# Patient Record
Sex: MALE | Race: WHITE | NOT HISPANIC OR LATINO | Employment: FULL TIME | URBAN - METROPOLITAN AREA
[De-identification: names, ages, dates, MRNs, and addresses within clinical notes are randomized per-mention and may not be internally consistent; named-entity substitution may affect disease eponyms.]

---

## 2017-12-20 ENCOUNTER — OFFICE VISIT - RIVER FALLS (OUTPATIENT)
Dept: FAMILY MEDICINE | Facility: CLINIC | Age: 42
End: 2017-12-20

## 2017-12-20 ASSESSMENT — MIFFLIN-ST. JEOR: SCORE: 2324.83

## 2017-12-21 LAB
CHOLEST SERPL-MCNC: 189 MG/DL
CHOLEST/HDLC SERPL: 4.6 {RATIO}
CREAT SERPL-MCNC: 0.98 MG/DL (ref 0.6–1.35)
GLUCOSE BLD-MCNC: 90 MG/DL (ref 65–99)
HDLC SERPL-MCNC: 41 MG/DL
LDLC SERPL CALC-MCNC: 126 MG/DL
NONHDLC SERPL-MCNC: 148 MG/DL
TRIGL SERPL-MCNC: 113 MG/DL

## 2018-10-23 ENCOUNTER — AMBULATORY - RIVER FALLS (OUTPATIENT)
Dept: FAMILY MEDICINE | Facility: CLINIC | Age: 43
End: 2018-10-23

## 2019-02-13 ENCOUNTER — OFFICE VISIT - RIVER FALLS (OUTPATIENT)
Dept: FAMILY MEDICINE | Facility: CLINIC | Age: 44
End: 2019-02-13

## 2019-02-13 ASSESSMENT — MIFFLIN-ST. JEOR: SCORE: 2327.55

## 2019-07-20 ENCOUNTER — OFFICE VISIT - RIVER FALLS (OUTPATIENT)
Dept: FAMILY MEDICINE | Facility: CLINIC | Age: 44
End: 2019-07-20

## 2020-05-12 ENCOUNTER — COMMUNICATION - RIVER FALLS (OUTPATIENT)
Dept: FAMILY MEDICINE | Facility: CLINIC | Age: 45
End: 2020-05-12

## 2020-05-13 ENCOUNTER — AMBULATORY - RIVER FALLS (OUTPATIENT)
Dept: FAMILY MEDICINE | Facility: CLINIC | Age: 45
End: 2020-05-13

## 2021-03-24 ENCOUNTER — AMBULATORY - RIVER FALLS (OUTPATIENT)
Dept: FAMILY MEDICINE | Facility: CLINIC | Age: 46
End: 2021-03-24

## 2021-04-21 ENCOUNTER — AMBULATORY - RIVER FALLS (OUTPATIENT)
Dept: FAMILY MEDICINE | Facility: CLINIC | Age: 46
End: 2021-04-21

## 2021-10-27 ENCOUNTER — OFFICE VISIT - RIVER FALLS (OUTPATIENT)
Dept: FAMILY MEDICINE | Facility: CLINIC | Age: 46
End: 2021-10-27

## 2021-10-27 ASSESSMENT — MIFFLIN-ST. JEOR: SCORE: 2304.87

## 2021-10-28 ENCOUNTER — AMBULATORY - RIVER FALLS (OUTPATIENT)
Dept: FAMILY MEDICINE | Facility: CLINIC | Age: 46
End: 2021-10-28

## 2021-10-28 LAB
BUN SERPL-MCNC: 16 MG/DL (ref 7–25)
BUN/CREAT RATIO - HISTORICAL: NORMAL (ref 6–22)
CALCIUM SERPL-MCNC: 10 MG/DL (ref 8.6–10.3)
CHLORIDE BLD-SCNC: 104 MMOL/L (ref 98–110)
CHOLEST SERPL-MCNC: 192 MG/DL
CHOLEST/HDLC SERPL: 4.4 {RATIO}
CO2 SERPL-SCNC: 25 MMOL/L (ref 20–32)
COLOR UR AUTO: NORMAL
CREAT SERPL-MCNC: 0.92 MG/DL (ref 0.6–1.35)
EGFRCR SERPLBLD CKD-EPI 2021: 99 ML/MIN/1.73M2
GLUCOSE BLD-MCNC: 76 MG/DL (ref 65–99)
HDLC SERPL-MCNC: 44 MG/DL
LDLC SERPL CALC-MCNC: 128 MG/DL
NONHDLC SERPL-MCNC: 148 MG/DL
POTASSIUM BLD-SCNC: 4 MMOL/L (ref 3.5–5.3)
SODIUM SERPL-SCNC: 140 MMOL/L (ref 135–146)
TRIGL SERPL-MCNC: 98 MG/DL
TSH SERPL DL<=0.005 MIU/L-ACNC: 2.76 MIU/L (ref 0.4–4.5)
URATE SERPL-MCNC: 9.1 MG/DL (ref 4–8)

## 2021-10-29 LAB — H PYLORI AG STL QL IA: NORMAL

## 2021-10-31 ENCOUNTER — COMMUNICATION - RIVER FALLS (OUTPATIENT)
Dept: FAMILY MEDICINE | Facility: CLINIC | Age: 46
End: 2021-10-31

## 2021-11-30 ENCOUNTER — AMBULATORY - RIVER FALLS (OUTPATIENT)
Dept: FAMILY MEDICINE | Facility: CLINIC | Age: 46
End: 2021-11-30

## 2021-11-30 ENCOUNTER — OFFICE VISIT - RIVER FALLS (OUTPATIENT)
Dept: FAMILY MEDICINE | Facility: CLINIC | Age: 46
End: 2021-11-30

## 2021-12-01 ENCOUNTER — COMMUNICATION - RIVER FALLS (OUTPATIENT)
Dept: FAMILY MEDICINE | Facility: CLINIC | Age: 46
End: 2021-12-01

## 2021-12-01 LAB
BUN SERPL-MCNC: 21 MG/DL (ref 7–25)
BUN/CREAT RATIO - HISTORICAL: NORMAL (ref 6–22)
CALCIUM SERPL-MCNC: 9.7 MG/DL (ref 8.6–10.3)
CHLORIDE BLD-SCNC: 105 MMOL/L (ref 98–110)
CO2 SERPL-SCNC: 25 MMOL/L (ref 20–32)
CREAT SERPL-MCNC: 1.08 MG/DL (ref 0.6–1.35)
EGFRCR SERPLBLD CKD-EPI 2021: 82 ML/MIN/1.73M2
GLUCOSE BLD-MCNC: 86 MG/DL (ref 65–99)
POTASSIUM BLD-SCNC: 4.1 MMOL/L (ref 3.5–5.3)
SODIUM SERPL-SCNC: 140 MMOL/L (ref 135–146)

## 2021-12-27 ENCOUNTER — COMMUNICATION - RIVER FALLS (OUTPATIENT)
Dept: FAMILY MEDICINE | Facility: CLINIC | Age: 46
End: 2021-12-27

## 2022-02-11 VITALS
HEART RATE: 76 BPM | HEIGHT: 77 IN | DIASTOLIC BLOOD PRESSURE: 98 MMHG | WEIGHT: 295 LBS | BODY MASS INDEX: 34.83 KG/M2 | TEMPERATURE: 97.7 F | SYSTOLIC BLOOD PRESSURE: 138 MMHG

## 2022-02-11 VITALS
BODY MASS INDEX: 34.76 KG/M2 | WEIGHT: 294.4 LBS | DIASTOLIC BLOOD PRESSURE: 85 MMHG | HEART RATE: 72 BPM | TEMPERATURE: 98 F | HEIGHT: 77 IN | SYSTOLIC BLOOD PRESSURE: 133 MMHG

## 2022-02-11 VITALS
HEART RATE: 50 BPM | DIASTOLIC BLOOD PRESSURE: 90 MMHG | OXYGEN SATURATION: 98 % | SYSTOLIC BLOOD PRESSURE: 130 MMHG | TEMPERATURE: 98 F | WEIGHT: 286.2 LBS

## 2022-02-11 VITALS
BODY MASS INDEX: 34.24 KG/M2 | HEIGHT: 77 IN | HEART RATE: 73 BPM | TEMPERATURE: 97.6 F | SYSTOLIC BLOOD PRESSURE: 141 MMHG | WEIGHT: 290 LBS | DIASTOLIC BLOOD PRESSURE: 93 MMHG

## 2022-02-16 NOTE — TELEPHONE ENCOUNTER
---------------------  From: Jihan Salcedo CMA   To: [employer] Message Pool ([id number]_[place] [facility] [place]);     Sent: 2/4/2019 1:22:42 PM CST  Subject: Rx FYI     PCP:   ANDRIY      Time of Call:  1242       Person Calling:  Patient  Phone number:  [phone]    Returned call at: 1320    Note:   Patient called requesting an Rx for Indomethacin for gout flare up. This was last filled 1/23/17 and patient is overdue for annual appt. He was informed he would need an appointment to address concerns. Pt states he will call back to make an appt.    Last office visit and reason:  12/20/2017 Annual w/ TFS---------------------  From: April Quesada ([employer] Message Pool ([id number]))   To: Kvng Ordonez MD;     Sent: 2/6/2019 10:37:16 AM CST  Subject: FW: Rx FYI     okay to fill?---------------------  From: Kvng Ordonez MD   To: [employer] Message Pool ([id number]_[place] [facility] [place]);     Sent: 2/6/2019 11:01:24 AM CST  Subject: RE: Rx FYI     ok to refill same # and directionsrx sent.

## 2022-02-16 NOTE — PROGRESS NOTES
Patient:   LUCIE SULLIVAN            MRN: [id number]            FIN: [id number]               Age:   43 years     Sex:  Male     :  1975   Associated Diagnoses:   Annual physical exam; GERD (gastroesophageal reflux disease); Umbilical hernia   Author:   Taye Francois MD      Chief Complaint   2019 4:49 PM CST    Annual exam.        Well Adult History   Had torn left meniscus in past with arthroscopy.  Takes indomethacin about a couple times a year.  Gets joint pain in elbows and shoulders with working out. Feels restricted in motion.  Muscles cramp up in sleep noticed by girlfriend. Gets leg cramps during the day sometimes.  Gets some dry skin in the winter.  Has been checking blood pressure at home. Has had diastolic 101 when trying to donate blood and turned down.  Lack of energy on the weekends 6 months.  Heartburn for the past 6 months with Tums twice a day. Took Prilosec 15 years ago  Snores alot and feels rested in the morning during the week.  No sleep apnea (girlfriend has not reported)  Trouble with erections since vasectomy. Able to get erections and ejaculate just takes longer. Tried Cialis thru the mail and helped      Review of Systems   Constitutional:  No fever, No weakness, No fatigue.    Eye:  No recent visual problem.    Ear/Nose/Mouth/Throat:  No ear pain, No tinnitus.    Respiratory:  No shortness of breath, No cough, No wheezing.    Cardiovascular:  No chest pain, No peripheral edema.    Gastrointestinal:  No nausea, No vomiting, No diarrhea.    Genitourinary:  No dysuria, No change in urine stream.    Hematology/Lymphatics:  No bruising tendency, No bleeding tendency.    Endocrine:  Negative.    Immunologic:  Negative.    Musculoskeletal:  No joint pain, No decreased range of motion.    Integumentary:  No rash.    Neurologic:  No numbness, No headache.    Psychiatric:  Negative.    All other systems reviewed and negative   PHQ-9: 3pts (2019)  CAGE: 0pts with 5-10 drinks a  week      Health Status   Allergies:    Allergic Reactions (Selected)  No known allergies   Medications:  (Selected)   Prescriptions  Prescribed  indomethacin 50 mg oral capsule: = 1 cap(s) ( 50 mg ), PO, TID, PRN: for gout pain, # 30 cap(s), 5 Refill(s), Type: Maintenance, Pharmacy: [facility], 1 cap(s) Oral tid,PRN:for gout pain   Problem list:    All Problems  HLD (hyperlipidemia) / SNOMED CT 61418889 / Confirmed  Obese / SNOMED CT 3517247515 / Probable  Resolved: History of chicken pox / SNOMED CT Z294VJQ2-0685--GE858T6100H2      Histories   Procedure history:    Medial meniscus tear (IMO 304860) performed by Sunny Monique MD on 8/17/2016 at 41 Years.  Comments:  10/13/2016 2:15 PM CDT - [place] Goldberg  Left knee.  History of tonsillectomy (SNOMED CT 4572553876) in 1980 at 5 Years.  [place] teeth extracted (ICD-9-.50).  Hx of vasectomy (SNOMED CT ZQS30B38-F5DK-3L13-[id number]).     Social History: Relationship (Christie) 2016. 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 Nonsmoker  Family History: Parents healthy      Physical Examination   Vital Signs   2/13/2019 5:14 PM CST Systolic Blood Pressure 138 mmHg  HI    Diastolic Blood Pressure 98 mmHg  HI   2/13/2019 4:49 PM CST Temperature Tympanic 97.7 DegF  LOW    Peripheral Pulse Rate 76 bpm    Systolic Blood Pressure 142 mmHg  HI    Diastolic Blood Pressure 112 mmHg  HI      Measurements from flowsheet : Measurements   2/13/2019 4:49 PM CST Height Measured - Standard 76.5 in    Weight Measured - Standard 295 lb    BSA 2.68 m2    Body Mass Index 35.44 kg/m2  HI      General:  Alert and oriented, No acute distress.    Eye:  Normal conjunctiva.    HENT:  Tympanic membranes are clear, No pharyngeal erythema.    Neck:  No lymphadenopathy, No thyromegaly.    Respiratory:  Lungs are clear to auscultation.    Cardiovascular:  Normal rate, Regular rhythm, No murmur, No edema.    Gastrointestinal:  Soft,  Non-tender, Non-distended, umbilical hernia.    Genitourinary:  Normal genitalia for age and sex.    Musculoskeletal:  Normal range of motion, Normal strength, No tenderness, Normal gait.    Integumentary:  Warm, No rash.    Neurologic:  Alert, Oriented, Normal sensory, Normal motor function, No focal deficits, Normal deep tendon reflexes.    Psychiatric:  Cooperative, Appropriate mood & affect.       Impression and Plan   Diagnosis     Annual physical exam (UCJ96-UY Z00.00).     GERD (gastroesophageal reflux disease) (HAK78-OI K21.9).     Umbilical hernia (OKN48-PB K42.9).         Cardiac: check fasting labs. Discussed importance of weight loss  Colon and Prostate Cancer Screen: no early family history  Immunizations: Adacel 2014  GERD: handout given and encourage Prilosec OTC for a couple months.  Weight: discussed  Erectile dysfunction: will check labs and try viagra  Umbilical hernia: asymptomatic and monitor  Eczema: discussed Cortaid and lotion  Blood pressure: continue to monitor and follow up in 1-2 months for recheck

## 2022-02-16 NOTE — NURSING NOTE
CAGE Assessment Entered On:  2/14/2019 12:34 PM CST    Performed On:  2/13/2019 12:34 PM CST by Fina Rice               Assessment   Have you ever felt you should cut down on your drinking :   No   Have people annoyed you by criticizing your drinking :   No   Have you ever felt bad or guilty about your drinking :   No   Have you ever taken a drink first thing in the morning to steady your nerves or get rid of a hangover (Eye-opener) :   No   CAGE Score :   0    Fina Rice - 2/14/2019 12:34 PM CST

## 2022-02-16 NOTE — LETTER
(Inserted Image. Unable to display)            October 31, 2021        LUCIE SULLIVAN      [address] [place] [address]        Dear LUCIE,    Thank you for selecting [facility]  for your healthcare needs.  Below you will find the results of the recent tests done at our clinic.     Your uric acid is elevated. We will discuss your gout treatment on your next visit.  Please let us know you received this message by either selecting Forward or Reply at the top.  Thank you      Result Name Current Result Reference Range   Uric Acid (mg/dL) ((H)) 9.1 10/27/2021 4.0 - 8.0   Cholesterol (mg/dL)  192 10/27/2021  - <200   HDL (mg/dL)  44 10/27/2021 > OR = 40 -    Triglyceride (mg/dL)  98 10/27/2021  - <150   LDL ((H)) 128 10/27/2021    Cholesterol/HDL Ratio  4.4 10/27/2021  - <5.0   Non-HDL Cholesterol ((H)) 148 10/27/2021  - <130   Glucose Level (mg/dL)  76 10/27/2021 65 - 99   BUN (mg/dL)  16 10/27/2021 7 - 25   Creatinine Level (mg/dL)  0.92 10/27/2021 0.60 - 1.35   eGFR (mL/min/1.73m2)  99 10/27/2021 > OR = 60 -    eGFR  (mL/min/1.73m2)  115 10/27/2021 > OR = 60 -    BUN/Creat Ratio  NOT APPLICABLE 10/27/2021 6 - 22   Sodium Level (mmol/L)  140 10/27/2021 135 - 146   Potassium Level (mmol/L)  4.0 10/27/2021 3.5 - 5.3   Chloride Level (mmol/L)  104 10/27/2021 98 - 110   CO2 Level (mmol/L)  25 10/27/2021 20 - 32   Calcium Level (mg/dL)  10.0 10/27/2021 8.6 - 10.3   TSH (mIU/L)  2.76 10/27/2021 0.40 - 4.50   UA Color  See comment 10/27/2021    Helicobacter pylori Ag Stool  See comment 10/28/2021        Please contact me or my assistant at [phone] if you have any questions.     Sincerely,        Kvng Ordonez MD        What do your labs mean?  Below is a glossary of commonly ordered labs:  LDL   Bad Cholesterol   HDL   Good Cholesterol  AST/ALT   Liver Function   Cr/Creatinine   Kidney Function  Microalbumin   Kidney Function  BUN   Kidney Function  PSA   Prostate     TSH   Thyroid Hormone  HgbA1c   Diabetes Test   Hgb (Hemoglobin)   Red Blood Cells

## 2022-02-16 NOTE — PROGRESS NOTES
Patient:   MICHAEL SULLIVAN            MRN: [id number]            FIN: [id number]               Age:   46 years     Sex:  Male     :  1975   Associated Diagnoses:   Well adult exam; Chronic GERD; Chronic gout; HLD (hyperlipidemia); HTN (hypertension)   Author:   Kvng Ordonez MD      Visit Information      Date of Service: 10/27/2021 04:13 pm  Performing Location: [facility]  Encounter#: [id number]      Primary Care Provider (PCP):  Kvng Ordonez MD    NPI# [id number]      Referring Provider:  Kvng Ordonez MD# [id number]      Chief Complaint   10/27/2021 4:16 PM CDT   Annual exam.     Routine Health Care Visit      History of Present Illness   Michael is in today for physical exam.  Overall he is doing well.  He is .  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  No tobacco use.  He does have a few beers several times a week.  He notes he has issues with he thinks gout as he gets twinges of pain in his ankles a few times a week for which he takes indomethacin it seems to help.  He does get leg cramps at night a couple times a week.         Review of Systems   Constitutional:  Negative.    Eye:  Negative.    Ear/Nose/Mouth/Throat:  Negative.    Respiratory:  Negative.    Cardiovascular:  Negative.    Gastrointestinal:  Negative.    Genitourinary:  Negative.    Hematology/Lymphatics:  Negative.    Endocrine:  Negative.    Immunologic:  Negative.    Musculoskeletal:  Negative except as documented in history of present illness.    Integumentary:  Negative.    Neurologic:  Negative.    Psychiatric:  Negative.    All other systems reviewed and negative      Health Status   Allergies:    Allergic Reactions (Selected)  No known allergies   Medications:  (Selected)   Prescriptions  Prescribed  Viagra 100 mg oral tablet: 0.5-1 tab(s), PO, Daily, # 10 tab(s), 1 Refill(s), Type: Maintenance, Pharmacy: [facility], 0.5-1 tab(s) Oral daily  indomethacin 50 mg oral capsule: = 1 cap(s)  ( 50 mg ), Oral, tid, PRN: for gout pain, # 30 cap(s), 2 Refill(s), Type: Maintenance, Pharmacy: [facility], 1 cap(s) Oral tid,PRN:for gout pain, 76.5, in, 10/27/21 16:20:00 CDT, Height Measured, 290, lb, 10/27/21 16:20:00 CDT, Weight Lisa...  losartan 50 mg oral tablet: = 1 tab(s) ( 50 mg ), Oral, daily, # 90 tab(s), 1 Refill(s), Type: Maintenance, Pharmacy: [facility], 1 tab(s) Oral daily, 76.5, in, 10/27/21 16:20:00 CDT, Height Measured, 290, lb, 10/27/21 16:20:00 CDT, Weight Measured,    Medications          *denotes recorded medication          indomethacin 50 mg oral capsule: 50 mg, 1 cap(s), Oral, tid, PRN: for gout pain, 30 cap(s), 2 Refill(s).          losartan 50 mg oral tablet: 50 mg, 1 tab(s), Oral, daily, 90 tab(s), 1 Refill(s).          Viagra 100 mg oral tablet: 0.5-1 tab(s), PO, Daily, 10 tab(s), 1 Refill(s).       Problem list:    All Problems  Obese / SNOMED CT 9546612222 / Probable  HLD (hyperlipidemia) / SNOMED CT 19340015 / Confirmed  Resolved: History of chicken pox / SNOMED CT K065LHE2-1925--PH851G2530B5      Histories   Past Medical History:    Active  Obese (1637148551)  HLD (hyperlipidemia) (29583728)  Comments:  1/22/2013 CST 9:14 AM CST - Candice Infante  Treated with diet.  Resolved  History of chicken pox (V064CJT3-7786--BT629A6364Z0):  Resolved.   Family History:    Grandmother (P)  Diabetes mellitus  Mother: Sera      History is negative.  Father: Olaf    Diabetes mellitus  Brother: Sridhar      History is negative.     Procedure history:    Medial meniscus tear (IMO 785111) performed by Sunny Monique MD on 8/17/2016 at 41 Years.  Comments:  10/13/2016 2:15 PM CDT - Yusuf , [place]  Left knee.  History of tonsillectomy (SNOMED CT 1391414938) in 1980 at 5 Years.  [place] teeth extracted (ICD-9-.50).  Hx of vasectomy (SNOMED CT FJT83C84-M3BL-8H43-[id number]).   Social History:        Electronic Cigarette/Vaping Assessment            Electronic Cigarette  Use: Never.      Alcohol Assessment            Current, 3 times per week, 1 drinks/episode average.  2 drinks/episode maximum.      Tobacco Assessment            Never            Never (less than 100 in lifetime)      Substance Abuse Assessment            Never      Employment and Education Assessment            Employed, Work/School description: ..      Home and Environment Assessment            Marital status: .      Nutrition and Health Assessment            Type of diet: Regular.      Exercise and Physical Activity Assessment            Exercise frequency: Never.      Sexual Assessment            Sexually active: Yes.  Identifies as male, Sexual orientation: Straight or heterosexual.  History of STD:               Yes.  Contraceptive Use Details: vasectomy.        Physical Examination   Vital Signs   10/27/2021 4:16 PM CDT Temperature Tympanic 97.6 DegF  LOW    Peripheral Pulse Rate 73 bpm    Pulse Site Radial artery    HR Method Electronic    Systolic Blood Pressure 141 mmHg  HI    Diastolic Blood Pressure 93 mmHg  HI    Mean Arterial Pressure 109 mmHg    BP Site Right arm    BP Method Electronic      Measurements from flowsheet : Measurements   10/27/2021 4:16 PM CDT Height Measured - Standard 76.5 in    Weight Measured - Standard 290 lb    BSA 2.66 m2    Body Mass Index 34.84 kg/m2  HI      General:  Alert and oriented.    Eye:  Pupils are equal, round and reactive to light, Normal conjunctiva.    HENT:  Normocephalic, Tympanic membranes are clear, Oral mucosa is moist.    Neck:  Supple, Non-tender, No lymphadenopathy, No thyromegaly.    Respiratory:  Breath sounds are equal, Symmetrical chest wall expansion.         Respirations: Are within normal limits.         Pattern: Regular.         Breath sounds: Bilateral, Within normal limits.    Cardiovascular:  Normal rate, Regular rhythm, No murmur, Good pulses equal in all extremities, Normal peripheral perfusion, No edema.     Gastrointestinal:  Soft, Non-tender, Non-distended, Normal bowel sounds.    Musculoskeletal:  No tenderness, No swelling.    Integumentary:  Warm, Dry.    Neurologic:  No focal deficits.    Cognition and Speech:  Oriented, Speech clear and coherent.    Psychiatric:  Appropriate mood & affect, Normal judgment.       Health Maintenance      Recommendations     Pending (in the next year)        OverDue           Lipid Disorders Screen due  12/20/18  and every 1  year(s)           Depression Screen due  02/13/20  and every 1  year(s)           Tobacco Use Screen due  07/20/20  and every 1  year(s)           Influenza Vaccine due  09/01/21  and every 1  year(s)        Due            Type 2 Diabetes Mellitus Screen due  10/27/21  Variable frequency     Satisfied (in the past 1 year)        Satisfied            Body Mass Index Check on  10/27/21.           COVID-19 Vaccine (Moderna) Dose 2 on  04/21/21.           COVID-19 Vaccine (Moderna) Dose 1 on  03/24/21.           High Blood Pressure Screen on  10/27/21.           Obesity Screen and Counseling on  10/27/21.          Impression and Plan   Diagnosis     Well adult exam (VNG61-TQ Z00.00).     Chronic GERD (ZDT76-GM K21.9).     Chronic gout (UHE93-VF M1A.9XX0).     HLD (hyperlipidemia) (VFR38-CF E78.5).     HTN (hypertension) (AGK94-UK I10).     Course:  Progressing as expected.    Plan:  hcm reviewed, 1.  Hypertension we will start losartan discussed blood pressures treatment track his blood pressures labs in a month follow-up in 6 months  2.  Hyperlipidemia controlled with diet #3 obesity needs work on weight loss  4.  Chronic reflux discussed using famotidine daily and checking for H. pylori discussed antireflux measures  5.  Family history of melanoma in his father will get yearly skin checks  6.  Gout he has had a definite flare in the past but on questionable whether not these are flares will check uric acid he will track his symptoms consider allopurinol  .     Patient Instructions:       Counseled: Patient, Diet, Activity, Verbalized understanding.    Counseled:  Patient, Routine exercise and healthy weight maintenance discussed.    Patient Instructions:  Return to clinic in one year for next routine health visit, or sooner if problems or concerns.

## 2022-02-16 NOTE — NURSING NOTE
Depression Screening Entered On:  11/16/2021 10:11 AM CST    Performed On:  10/27/2020 10:10 AM CDT by Fina Schulz               Depression Screening   Little Interest - Pleasure in Activities :   Not at all   Feeling Down, Depressed, Hopeless :   Not at all   Initial Depression Screen Score :   0 Score   Poor Appetite or Overeating :   Not at all   Trouble Falling or Staying Asleep :   Not at all   Feeling Tired or Little Energy :   Several days   Feeling Bad About Yourself :   Not at all   Trouble Concentrating :   Not at all   Moving or Speaking Slowly :   Not at all   Thoughts Better Off Dead or Hurting Self :   Not at all   Difficulty at Work, Home, Getting Along :   Not difficult at all   Detailed Depression Screen Score :   1    Total Depression Screen Score :   1    Fina Schulz - 11/16/2021 10:10 AM CST

## 2022-02-16 NOTE — NURSING NOTE
Comprehensive Intake Entered On:  7/20/2019 3:04 PM CDT    Performed On:  7/20/2019 2:58 PM CDT by Bhargavi Leroy LPN               Summary   Chief Complaint :   Hx of gout. same type of pain. normally takes medication and it gets better but this time it has been a few days. started at the arch now on top of the foot and ankle.  right foot   Weight Measured :   286.2 lb(Converted to: 286 lb 3 oz, 129.82 kg)    Systolic Blood Pressure :   130 mmHg   Diastolic Blood Pressure :   90 mmHg (HI)    Mean Arterial Pressure :   103 mmHg   Peripheral Pulse Rate :   50 bpm (LOW)    BP Site :   Right arm   BP Method :   Manual   HR Method :   Electronic   Temperature Tympanic :   98.0 DegF(Converted to: 36.7 DegC)    Oxygen Saturation :   98 %   Bhargavi Leroy LPN - 7/20/2019 2:58 PM CDT   Health Status   Allergies Verified? :   Yes   Medication History Verified? :   Yes   Medical History Verified? :   Yes   Pre-Visit Planning Status :   Completed   Tobacco Use? :   Never smoker   Bhargavi Leroy LPN - 7/20/2019 2:58 PM CDT   Consents   Consent for Immunization Exchange :   Consent Granted   Consent for Immunizations to Providers :   Consent Granted   Bhargavi Leroy LPN - 7/20/2019 2:58 PM CDT   Meds / Allergies   (As Of: 7/20/2019 3:04:11 PM CDT)   Allergies (Active)   No known allergies  Estimated Onset Date:   Unspecified ; Created By:   Lorenza Lei; Reaction Status:   Active ; Category:   Drug ; Substance:   No known allergies ; Type:   Allergy ; Updated By:   Lorenza Lei; Reviewed Date:   7/20/2019 3:02 PM CDT        Medication List   (As Of: 7/20/2019 3:04:11 PM CDT)   Prescription/Discharge Order    indomethacin  :   indomethacin ; Status:   Prescribed ; Ordered As Mnemonic:   indomethacin 50 mg oral capsule ; Simple Display Line:   50 mg, 1 cap(s), PO, TID, PRN: for gout pain, 30 cap(s), 1 Refill(s) ; Ordering Provider:   Taye Francois MD; Catalog Code:   indomethacin ; Order Dt/Tm:   2/13/2019 5:07:07 PM           sildenafil  :   sildenafil ; Status:   Prescribed ; Ordered As Mnemonic:   Viagra 100 mg oral tablet ; Simple Display Line:   0.5-1 tab(s), PO, Daily, 10 tab(s), 1 Refill(s) ; Ordering Provider:   Taye Francois MD; Catalog Code:   sildenafil ; Order Dt/Tm:   2/13/2019 5:30:47 PM

## 2022-02-16 NOTE — NURSING NOTE
Depression Screening Entered On:  2/14/2019 12:34 PM CST    Performed On:  2/13/2019 12:34 PM CST by Fina Rice               Depression Screening   Feeling Down, Depressed, Hopeless :   Not at all   Little Interest - Pleasure in Activities :   Several days   Initial Depression Screen Score :   1    Trouble Falling or Staying Asleep :   Not at all   Feeling Tired or Little Energy :   Several days   Poor Appetite or Overeating :   Not at all   Feeling Bad About Yourself :   Not at all   Trouble Concentrating :   Several days   Moving or Speaking Slowly :   Not at all   Thoughts Better Off Dead or Hurting Self :   Not at all   Detailed Depression Screen Score :   2    Total Depression Screen Score :   3    STACI Difficulty with Work, Home, Others :   Not difficult at all   Fina Rice - 2/14/2019 12:34 PM CST

## 2022-02-16 NOTE — PROGRESS NOTES
Chief Complaint    Hx of gout. same type of pain. normally takes medication and it gets better but this time it has been a few days. started at the arch now on top of the foot and ankle.  right foot  History of Present Illness      patient present to clinic today for left foot pain, feels like previous episodes of gout, usually he takes indomethicin and it goes away, this episode is lasting longer, has not modified diet at all for his gout, wonders if he ought to get some meds to help prevent gout, throbs, worse with weight bearing and movement and palpation,      Review of systems is negative except as per HPI including:  no fevers, chills, sore throat, runny nose, nausea, vomiting, constipation, diarrhea, rash or new skin lesions, chest pain, palpitations, slurred speech, new paresthesia, shortness of breath or wheeze.      Exam:      General: alert and oriented ×3 no acute distress.      HEENT: pupils are equal round and reactive to light extraocular motion is intact. Normocephalic and atraumatic.       Hearing is grossly normal and there is no otorrhea.       Nares are patent there is no rhinorrhea.       Mucous membranes are moist and pink.      Chest: has bilateral rise with no increased work of breathing.      Cardiovascular: normal perfusion and brisk capillary refill.      Musculoskeletal: no gross focal abnormalities and normal gait.      Neuro: no gross focal abnormalities and memory seems intact.      Psychiatric: speech is clear and coherent and fluent. Patient dressed appropriately for the weather. Mood is appropriate and affect is full.                     Discussed with patient to return to clinic if symptoms worsen or do not improve  Physical Exam   Vitals & Measurements    T: 98.0   F (Tympanic)  HR: 50(Peripheral)  BP: 130/90  SpO2: 98%     WT: 286.2 lb   Assessment/Plan       Acute gout (M10.9)        modify diet,      Patient should return to clinic if symptoms worsen or do not improve, and as  needed for next annual exam.  f/u with pcp to discuss gout prophylaxis         Ordered:          predniSONE, = 2 tab(s) ( 40 mg ), Oral, daily, # 14 tab(s), 0 Refill(s), Type: Maintenance, Pharmacy: [facility], 2 tab(s) Oral daily,x7 day(s), (Ordered)          [id number] office outpatient visit 15 minutes (Charge), Quantity: 1, Acute gout           Patient Information     Name:LUCIE SULLIVAN      Address:      [address] [place] [id number]     Sex:Male     YOB: 1975     [phone]     Emergency Contact:CARL VALIENTE     MRN:[id number]     FIN:[id number]     Location:[facility]     Date of Service:07/20/2019      Primary Care Physician:       Taye Francois MD, [phone]      Attending Physician:       Lexie Silva MD, [phone]  Problem List/Past Medical History    Ongoing     HLD (hyperlipidemia)       Comments: Treated with diet.     Obese    Historical     History of chicken pox  Procedure/Surgical History     Medial meniscus tear (08/17/2016)            Comments: Left knee..     History of tonsillectomy (1980)           Hx of vasectomy           [place] teeth extracted        Medications     Viagra 100 mg oral tablet: 0.5-1 tab(s), PO, Daily, 10 tab(s), 1 Refill(s).     predniSONE 20 mg oral tablet: 40 mg, 2 tab(s), Oral, daily, for 7 day(s), 14 tab(s), 0 Refill(s).      Allergies    No known allergies  Social History    Smoking Status - 07/20/2019     Never smoker     Alcohol      Current, 3 times per week, 1 drinks/episode average. 2 drinks/episode maximum., 02/14/2019     Employment/School      Employed, Work/School description: .., 01/24/2013     Exercise      Exercise frequency: Never., 02/14/2019     Home/Environment      Marital status: ., 02/14/2019     Nutrition/Health      Type of diet: Regular., 02/14/2019     Sexual      Sexually active: Yes. Identifies as male, Sexual orientation: Straight or heterosexual.  History of STD: Yes. Contraceptive Use Details: vasectomy., 02/14/2019     Substance Abuse      Never, 01/24/2013     Tobacco      Never, 01/24/2013  Family History    Diabetes mellitus: Father and Grandmother (P).    Mother: History is negative    Brother: History is negative  Immunizations      Vaccine Date Status      influenza virus vaccine, inactivated 10/23/2018 Given      tetanus/diphth/pertuss (Tdap) adult/adol 01/16/2014 Given      Td 01/01/2001 Recorded

## 2022-02-16 NOTE — NURSING NOTE
Comprehensive Intake Entered On:  10/27/2021 4:18 PM CDT    Performed On:  10/27/2021 4:16 PM CDT by Thu Solorzano CMA               Summary   Weight Measured :   290 lb(Converted to: 290 lb 0 oz, 131.542 kg)    Height Measured :   76.5 in(Converted to: 6 ft 4 in, 194.31 cm)    Body Mass Index :   34.84 kg/m2 (HI)    Body Surface Area :   2.66 m2   Systolic Blood Pressure :   141 mmHg (HI)    Diastolic Blood Pressure :   93 mmHg (HI)    Mean Arterial Pressure :   109 mmHg   Peripheral Pulse Rate :   73 bpm   Temperature Tympanic :   97.6 DegF(Converted to: 36.4 DegC)  (LOW)    Jeanine SMALL Thu - 10/27/2021 4:20 PM CDT   Chief Complaint :   Annual exam.   BP Site :   Right arm   Pulse Site :   Radial artery   BP Method :   Electronic   HR Method :   Electronic   Thu Solorzano CMA - 10/27/2021 4:16 PM CDT   Health Status   Allergies Verified? :   Yes   Medication History Verified? :   Yes   Pre-Visit Planning Status :   Completed   Thu Solorzano CMA - 10/27/2021 4:16 PM CDT   Demographics   Last Name :   Kemi   Address :   [address]   First Name :   Michael Fairbanks Initial :   T   Responsible Party Date of Birth () :   1975 CST   City :   [place]   State :   [place]   Zip Code :   [address]   Contact Relationship to Patient (other) :   Patient   Jeanine STACIEmmyThu - 10/27/2021 4:16 PM CDT   Providers Grid   Provider Name :    Dr Monique              Provider Specialty :    Knee                Thu Solorzano CMA - 10/27/2021 4:16 PM CDT         Consents   Consent for Immunization Exchange :   Consent Granted   Consent for Immunizations to Providers :   Consent Granted   hTu Solorzano CMA - 10/27/2021 4:16 PM CDT   Meds / Allergies   (As Of: 10/27/2021 4:23:26 PM CDT)   Allergies (Active)   No known allergies  Estimated Onset Date:   Unspecified ; Created By:   Lorenza Lei; Reaction Status:   Active ; Category:   Drug ; Substance:   No known allergies ; Type:   Allergy ; Updated By:   Quique  Lorenza; Reviewed Date:   7/20/2019 3:02 PM CDT        Medication List   (As Of: 10/27/2021 4:23:26 PM CDT)   Prescription/Discharge Order    predniSONE  :   predniSONE ; Status:   Completed ; Ordered As Mnemonic:   predniSONE 20 mg oral tablet ; Simple Display Line:   40 mg, 2 tab(s), Oral, daily, for 7 day(s), 14 tab(s), 0 Refill(s) ; Ordering Provider:   Lexie Silva MD; Catalog Code:   predniSONE ; Order Dt/Tm:   7/20/2019 4:17:14 PM CDT          sildenafil  :   sildenafil ; Status:   Prescribed ; Ordered As Mnemonic:   Viagra 100 mg oral tablet ; Simple Display Line:   0.5-1 tab(s), PO, Daily, 10 tab(s), 1 Refill(s) ; Ordering Provider:   Taye Francois MD; Catalog Code:   sildenafil ; Order Dt/Tm:   2/13/2019 5:30:47 PM CST            Social History   Social History   (As Of: 10/27/2021 4:23:26 PM CDT)   Alcohol:        Current, 3 times per week, 1 drinks/episode average.  2 drinks/episode maximum.   (Last Updated: 2/14/2019 12:35:01 PM CST by Fina Rice)          Tobacco:        Never   (Last Updated: 1/24/2013 12:07:56 PM CST by Lexi Regalado RN)   Never (less than 100 in lifetime)   (Last Updated: 10/27/2021 4:20:51 PM CDT by Thu Solorzano CMA)          Electronic Cigarette/Vaping:        Electronic Cigarette Use: Never.   (Last Updated: 10/27/2021 4:20:58 PM CDT by Thu Solorzano CMA)          Substance Abuse:        Never   (Last Updated: 1/24/2013 12:08:08 PM CST by Lexi Regalado RN)          Employment/School:        Employed, Work/School description: ..   (Last Updated: 1/24/2013 12:07:32 PM CST by Lexi Regalado RN)          Home/Environment:        Marital status: .   (Last Updated: 2/14/2019 12:35:24 PM CST by Fina Rice)          Nutrition/Health:        Type of diet: Regular.   (Last Updated: 2/14/2019 12:35:28 PM CST by Fina Rice)          Exercise:        Exercise frequency: Never.   (Last Updated: 2/14/2019 12:35:35 PM CST by Fina Rice)           Sexual:        Sexually active: Yes.  Identifies as male, Sexual orientation: Straight or heterosexual.  History of STD: Yes.  Contraceptive Use Details: vasectomy.   (Last Updated: 2/14/2019 12:36:03 PM CST by Fina Rice)

## 2022-02-16 NOTE — LETTER
(Inserted Image. Unable to display)            December 01, 2021        LUCIE SULLIVAN      [address] [place] [address]        Dear LUCIE,    Thank you for selecting [facility]  for your healthcare needs.  Below you will find the results of the recent tests done at our clinic.     All labs acceptable.      Result Name Current Result Reference Range   Glucose Level (mg/dL)  86 11/30/2021 65 - 99   Creatinine Level (mg/dL)  1.08 11/30/2021 0.60 - 1.35   Potassium Level (mmol/L)  4.1 11/30/2021 3.5 - 5.3       Please contact me or my assistant at [phone] if you have any questions.     Sincerely,        Kvng Ordonez MD        What do your labs mean?  Below is a glossary of commonly ordered labs:  LDL   Bad Cholesterol   HDL   Good Cholesterol  AST/ALT   Liver Function   Cr/Creatinine   Kidney Function  Microalbumin   Kidney Function  BUN   Kidney Function  PSA   Prostate    TSH   Thyroid Hormone  HgbA1c   Diabetes Test   Hgb (Hemoglobin)   Red Blood Cells

## 2022-02-16 NOTE — NURSING NOTE
CAGE Assessment Entered On:  11/16/2021 10:36 AM CST    Performed On:  10/27/2020 10:36 AM CDT by Fina Schulz               Assessment   Have you ever felt you should cut down on your drinking :   No   Have people annoyed you by criticizing your drinking :   No   Have you ever felt bad or guilty about your drinking :   No   Have you ever taken a drink first thing in the morning to steady your nerves or get rid of a hangover (Eye-opener) :   No   CAGE Score :   0    Fina Schulz - 11/16/2021 10:36 AM CST

## 2022-02-16 NOTE — NURSING NOTE
Quick Intake Entered On:  2/13/2019 5:14 PM CST    Performed On:  2/13/2019 5:14 PM CST by Taye Francois MD               Summary   Systolic Blood Pressure :   138 mmHg (HI)    Diastolic Blood Pressure :   98 mmHg (HI)    Mean Arterial Pressure :   111 mmHg   Taye Francois MD - 2/13/2019 5:14 PM CST

## 2022-02-16 NOTE — NURSING NOTE
Comprehensive Intake Entered On:  2/13/2019 4:55 PM CST    Performed On:  2/13/2019 4:49 PM CST by Thu Solorzano CMA               Summary   Chief Complaint :   Annual exam.   Weight Measured :   295 lb(Converted to: 295 lb 0 oz, 133.81 kg)    Height Measured :   76.5 in(Converted to: 6 ft 4 in, 194.31 cm)    Body Mass Index :   35.44 kg/m2 (HI)    Body Surface Area :   2.68 m2   Systolic Blood Pressure :   142 mmHg (HI)    Diastolic Blood Pressure :   112 mmHg (HI)    Mean Arterial Pressure :   122 mmHg   Peripheral Pulse Rate :   76 bpm   BP Site :   Right arm   Pulse Site :   Radial artery   BP Method :   Manual   HR Method :   Manual   Temperature Tympanic :   97.7 DegF(Converted to: 36.5 DegC)  (LOW)    Thu Solorzano CMA - 2/13/2019 4:49 PM CST   Health Status   Allergies Verified? :   Yes   Medication History Verified? :   Yes   Pre-Visit Planning Status :   Completed   Thu Solorzano CMA - 2/13/2019 4:49 PM CST   Consents   Consent for Immunization Exchange :   Consent Granted   Consent for Immunizations to Providers :   Consent Granted   Thu Solorzano CMA - 2/13/2019 4:49 PM CST   Meds / Allergies   (As Of: 2/13/2019 4:55:33 PM CST)   Allergies (Active)   No known allergies  Estimated Onset Date:   Unspecified ; Created By:   Lorenza Lei; Reaction Status:   Active ; Category:   Drug ; Substance:   No known allergies ; Type:   Allergy ; Updated By:   Lorenza Lei; Reviewed Date:   11/21/2016 5:10 PM CST        Medication List   (As Of: 2/13/2019 4:55:33 PM CST)   Prescription/Discharge Order    indomethacin  :   indomethacin ; Status:   Prescribed ; Ordered As Mnemonic:   indomethacin 50 mg oral capsule ; Simple Display Line:   50 mg, 1 cap(s), PO, TID, PRN: for gout pain, 30 cap(s), 5 Refill(s) ; Ordering Provider:   Kvng Ordonez MD; Catalog Code:   indomethacin ; Order Dt/Tm:   2/6/2019 12:33:14 PM

## 2022-02-16 NOTE — TELEPHONE ENCOUNTER
---------------------  From: Kristin Osorio RN (Phone Messages Pool ([id number]))   To: TFS Message Pool ([id number]);     Sent: 12/27/2021 3:48:25 PM CST  Subject: General Message       PCP:  TFS o/c      Time of Call:  3:20pm       Person Calling:  pt  Phone number:  [phone] requested return call after 3pm    Returned call at: 3:40pm    Note:   VM received from Michael, stating he was in to see TFS about a month ago. Discussed if family was vaccinated for covid. Pt has a 16yo dtr who is autistic and has severe fear/anxiety related to shots. Last vaccine she received was 5 yrs ago and she is still talking about it. Michael stated he is very worried with omicron going around and wants his dtr to be vaccinated. Pt is asking if TFS has suggestion on medicating pt prior to shot, to lessen anxiety and trauma to pt or if has other suggestions on how to get pt vaccinated.  Informed TFS is o/c today and returns 12/28.    Please advise.---------------------  From: Thu Solorzano CMA (TFS Message Pool ([id number]))   To: Kvng Ordonez MD;     Sent: 12/28/2021 7:41:27 AM CST  Subject: FW: General Message---------------------  From: Kvng Ordonez MD   To: TFS Message Pool ([id number]);     Sent: 12/28/2021 8:03:43 AM CST  Subject: RE: General Message     really no good med for that  could try valium like med if both he and her mom approveWill try pt after 1500 to discuss.LM to return call.Michael called back and will discuss with pt's mother.

## 2022-02-16 NOTE — LETTER
(Inserted Image. Unable to display)   February 28, 2019      LUCIE SULLIVAN  [address], [place] [id number]        Dear LUCIE,      Thank you for selecting [facility] for your healthcare needs.      Our records indicate you are due for the following services:     Fasting Lab Tests ~ Please do not eat or drink anything 10 hours prior to your scheduled appointment time.  (Water and any medications that you may need are allowed unless directed otherwise.)    If you had your labs done at another facility or with Direct Access Lab Testing at [facility], please bring in a copy of the results to your next visit, mail a copy, or drop off a copy of your results to your Healthcare Provider.      To schedule an appointment or if you have further questions, please contact your primary clinic:   [facility]       [phone]   Critical access hospital       [phone]              [facility]     [phone]      Powered by [employer]    Sincerely,    Taye Francois MD

## 2022-02-16 NOTE — TELEPHONE ENCOUNTER
Entered by Kris , April on May 12, 2020 11:45:13 AM CDT  left voicemail for pt to call back and schedule an appt for Gout - and a PX later in August       ---------------------  From: Maliha Toribio RN (Unit 2 Pool ([id number]_[facility]) )   To: Appointment Pool ([id number]_[place] [facility] [place]);     Sent: 5/12/2020 11:06:00 AM CDT    Please call him to schedule an appointment with BREANNA Marshall for PX and has concerns about Gout.

## 2022-02-16 NOTE — LETTER
(Inserted Image. Unable to display)   November 24, 2021      LUCIE SULLIVAN  [address] [place] [address]        Dear LUCIE,      Thank you for selecting [facility] for your healthcare needs.     The results of the cologuard stool test was received and your result was negative.      A negative result indicates a lower likelihood that colorectal cancer or advanced adenoma (pre-cancer) is present.  Periodic Colorectal Cancer Screenings are recommended in the future approximately every 3 years.          Please contact me or my assistant at [phone] if you have any questions or concerns.     Sincerely,

## 2022-02-16 NOTE — PROGRESS NOTES
Patient:   LUCIE SULLIVAN            MRN: [id number]            FIN: [id number]               Age:   42 years     Sex:  Male     :  1975   Associated Diagnoses:   Annual exam; HLD (Hyperlipidemia); Screening for STD (sexually transmitted disease)   Author:   Kvng Ordonez MD      Visit Information      Date of Service: 2017 03:55 pm  Performing Location: [facility]  Encounter#: [id number]      Primary Care Provider (PCP):  Kvng Ordonez MD# [id number]      Referring Provider:  Kvng Ordonez MD# [id number]      Chief Complaint   2017 3:58 PM CST   Px     Routine Health Care Visit      History of Present Illness             The patient presents for a general exam.  The patient's general health status is described as good.  The patient's diet is described as balanced.  Exercise: routine, anaerobic activity.  Associated symptoms consist of none.  Medical encounters: none.  Compliance problems: none.  Additional pertinent history: occasional caffeine use, tobacco use none and alcohol use socially.     night time leg cramps for past few years getting worse  Rare flare gout  lesion left elbo a few months          Review of Systems   Constitutional:  Negative.    Eye:  Negative.    Ear/Nose/Mouth/Throat:  Negative.    Respiratory:  Negative.    Cardiovascular:  Negative.    Gastrointestinal:  Negative.    Genitourinary:  Negative.    Hematology/Lymphatics:  Negative.    Endocrine:  Negative.    Immunologic:  Negative.    Musculoskeletal:  Negative except as documented in history of present illness.    Integumentary:  Negative except as documented in history of present illness.    Neurologic:  Negative.    Psychiatric:  Negative.    All other systems reviewed and negative      Health Status   Allergies:    Allergic Reactions (Selected)  No known allergies   Medications:  (Selected)   Prescriptions  Prescribed  indomethacin 50 mg oral capsule: 1 cap(s) ( 50 mg ), PO,  TID, PRN: for gout pain, # 30 cap(s), 5 Refill(s), Type: Maintenance, Pharmacy: [facility], 1 cap(s) po tid,PRN:for gout pain   Problem list:    All Problems  HLD (Hyperlipidemia) / ICD-9-.4 / Confirmed  Obese / ICD-9-.00 / Probable  Resolved: History of chicken pox / SNOMED CT [id number]      Histories   Past Medical History:    Active  HLD (Hyperlipidemia) (272.4)  Comments:  1/22/2013 CST 9:14 AM CST - Candice Infante  Treated with diet.  Resolved  History of chicken pox ([id number]--[id number]):  Resolved.   Family History:    Grandmother (P)  Diabetes mellitus  Mother    History is negative.  Father  Diabetes mellitus  Brother    History is negative.     Procedure history:    Medial meniscus tear (IMO [id number]) performed by Sunny Monique MD on 8/17/2016 at 41 Years.  Comments:  10/13/2016 2:15 PM - Verona Goldberg  Left knee.  History of tonsillectomy (SNOMED CT [id number]) in 1980 at 5 Years.  [place] teeth extracted (ICD-9-.50).  Hx of vasectomy (SNOMED CT [id number]).   Social History:        Alcohol Assessment: Current            Current                     Comments:                      08/02/2016 - Carri Pascal                     1-2 times per week, 2-3 drinks per time      Tobacco Assessment: Denies Tobacco Use            Never      Substance Abuse Assessment: Denies Substance Abuse            Never      Employment and Education Assessment            Employed, Work/School description: ..      Home and Environment Assessment            Marital status: .  Spouse/Partner name: Kenia.      Exercise and Physical Activity Assessment: Regular exercise                     Comments:                      08/02/2016 - Carri Pascal                     Daily stretching at work.      Sexual Assessment            Sexually active: Yes.  Sexual orientation: Heterosexual.  Uses condoms: Yes.  Contraceptive Use  Details:               vasectomy.        Physical Examination   Vital Signs   12/20/2017 4:00 PM CST Peripheral Pulse Rate 72 bpm    Systolic Blood Pressure 133 mmHg  HI    Diastolic Blood Pressure 85 mmHg  HI    Mean Arterial Pressure 101 mmHg   12/20/2017 3:58 PM CST Temperature Tympanic 98.0 DegF    Peripheral Pulse Rate 66 bpm    HR Method Electronic    Systolic Blood Pressure 144 mmHg  HI    Diastolic Blood Pressure 86 mmHg  HI    Mean Arterial Pressure 105 mmHg    BP Method Electronic      Measurements from flowsheet : Measurements   12/20/2017 3:58 PM CST Height Measured - Standard 76.5 in    Weight Measured - Standard 294.4 lb    BSA 2.68 m2    Body Mass Index 35.36 kg/m2      General:  Alert and oriented.    Eye:  Pupils are equal, round and reactive to light, Normal conjunctiva.    HENT:  Normocephalic, Tympanic membranes are clear, Oral mucosa is moist.    Neck:  Supple, Non-tender, No lymphadenopathy, No thyromegaly.    Respiratory:  Breath sounds are equal, Symmetrical chest wall expansion.         Respirations: Are within normal limits.         Pattern: Regular.         Breath sounds: Bilateral, Within normal limits.    Cardiovascular:  Normal rate, Regular rhythm, No murmur, Good pulses equal in all extremities, Normal peripheral perfusion, No edema.    Breast:  No mass.         Lymph nodes: Within normal limits.    Gastrointestinal:  Soft, Non-tender, Non-distended, Normal bowel sounds.    Musculoskeletal:  degenerative changes noted.    Integumentary:  Warm, Dry, 2 cm elevated lesion left olecranon.    Neurologic:  Alert, Oriented, Normal sensory, Normal motor function, No focal deficits, Cranial Nerves II-XII are grossly intact.    Cognition and Speech:  Oriented, Speech clear and coherent.    Psychiatric:  Appropriate mood & affect, Normal judgment.       Health Maintenance      Recommendations     Pending (in the next year)        OverDue           Lipid Disorders Screen (Male) due  07/02/17  and  every 1  year(s)           Depression Screen (Male) due  07/06/17  and every 1  year(s)        Due            Type 2 Diabetes Mellitus Screen (Male) due  12/20/17  Variable frequency     Satisfied (in the past 1 year)        Satisfied            Body Mass Index Check (Male) on  12/20/17.           High Blood Pressure Screen (Male) on  12/20/17.           High Blood Pressure Screen (Male) on  12/20/17.           Obesity Screen and Counseling (Male) on  12/20/17.           Tobacco Use Screen (Male) on  12/20/17.          Impression and Plan   Diagnosis     Annual exam (LET22-GJ Z00.00).     HLD (Hyperlipidemia) (FWB99-MB E78.5).     Screening for STD (sexually transmitted disease) (DUR51-YE Z11.3).     Course:  Not progressing as expected.    Plan:  BMI,Weight loss,exercise,diet discussed, labs for legs  declined rx likely rls  derm for lesion  BMI,Weight loss,exercise,diet discussed  .    Patient Instructions:       Counseled: Patient, Diet, Activity, Verbalized understanding.    Counseled:  Patient, Routine exercise and healthy weight maintenance discussed.    Patient Instructions:  Return to clinic in one year for next routine health visit, or sooner if problems or concerns.

## 2022-04-26 PROBLEM — I10 HYPERTENSION: Status: ACTIVE | Noted: 2022-04-26

## 2022-04-26 PROBLEM — M10.9 GOUT: Status: ACTIVE | Noted: 2022-04-26

## 2022-04-26 PROBLEM — E78.5 HYPERLIPIDEMIA: Status: ACTIVE | Noted: 2022-04-26

## 2022-04-26 PROBLEM — K21.9 GASTROESOPHAGEAL REFLUX DISEASE: Status: ACTIVE | Noted: 2022-04-26

## 2022-04-26 PROBLEM — E66.9 OBESITY: Status: ACTIVE | Noted: 2022-04-26

## 2022-04-26 RX ORDER — INDOMETHACIN 50 MG/1
50 CAPSULE ORAL 3 TIMES DAILY PRN
COMMUNITY
Start: 2021-10-27 | End: 2022-11-09

## 2022-04-26 RX ORDER — LOSARTAN POTASSIUM 50 MG/1
50 TABLET ORAL DAILY
COMMUNITY
Start: 2021-10-27 | End: 2022-11-09

## 2022-04-26 RX ORDER — FAMOTIDINE 40 MG/1
40 TABLET, FILM COATED ORAL DAILY
COMMUNITY
Start: 2021-10-27 | End: 2022-04-27

## 2022-04-27 ENCOUNTER — OFFICE VISIT (OUTPATIENT)
Dept: FAMILY MEDICINE | Facility: CLINIC | Age: 47
End: 2022-04-27
Payer: COMMERCIAL

## 2022-04-27 VITALS
HEIGHT: 77 IN | DIASTOLIC BLOOD PRESSURE: 86 MMHG | HEART RATE: 84 BPM | TEMPERATURE: 97.2 F | SYSTOLIC BLOOD PRESSURE: 133 MMHG | WEIGHT: 294.5 LBS | BODY MASS INDEX: 34.77 KG/M2

## 2022-04-27 DIAGNOSIS — K21.9 GASTROESOPHAGEAL REFLUX DISEASE WITHOUT ESOPHAGITIS: ICD-10-CM

## 2022-04-27 DIAGNOSIS — E66.01 MORBID OBESITY (H): ICD-10-CM

## 2022-04-27 DIAGNOSIS — I10 PRIMARY HYPERTENSION: Primary | ICD-10-CM

## 2022-04-27 DIAGNOSIS — E78.5 HYPERLIPIDEMIA, UNSPECIFIED HYPERLIPIDEMIA TYPE: ICD-10-CM

## 2022-04-27 DIAGNOSIS — M1A.0790 CHRONIC GOUT OF ANKLE, UNSPECIFIED CAUSE, UNSPECIFIED LATERALITY: ICD-10-CM

## 2022-04-27 LAB
ANION GAP SERPL CALCULATED.3IONS-SCNC: 4 MMOL/L (ref 3–14)
BUN SERPL-MCNC: 16 MG/DL (ref 7–30)
CALCIUM SERPL-MCNC: 10.1 MG/DL (ref 8.5–10.1)
CHLORIDE BLD-SCNC: 105 MMOL/L (ref 94–109)
CO2 SERPL-SCNC: 30 MMOL/L (ref 20–32)
CREAT SERPL-MCNC: 1.03 MG/DL (ref 0.66–1.25)
GFR SERPL CREATININE-BSD FRML MDRD: 90 ML/MIN/1.73M2
GLUCOSE BLD-MCNC: 90 MG/DL (ref 70–99)
POTASSIUM BLD-SCNC: 4.4 MMOL/L (ref 3.4–5.3)
SODIUM SERPL-SCNC: 139 MMOL/L (ref 133–144)
URATE SERPL-MCNC: 7.6 MG/DL (ref 3.5–7.2)

## 2022-04-27 PROCEDURE — 99214 OFFICE O/P EST MOD 30 MIN: CPT | Performed by: FAMILY MEDICINE

## 2022-04-27 PROCEDURE — 84550 ASSAY OF BLOOD/URIC ACID: CPT | Performed by: FAMILY MEDICINE

## 2022-04-27 PROCEDURE — 36415 COLL VENOUS BLD VENIPUNCTURE: CPT | Performed by: FAMILY MEDICINE

## 2022-04-27 PROCEDURE — 80048 BASIC METABOLIC PNL TOTAL CA: CPT | Performed by: FAMILY MEDICINE

## 2022-04-27 RX ORDER — AMLODIPINE BESYLATE 5 MG/1
5 TABLET ORAL DAILY
Qty: 90 TABLET | Refills: 1 | Status: SHIPPED | OUTPATIENT
Start: 2022-04-27 | End: 2022-11-09

## 2022-04-27 NOTE — PROGRESS NOTES
"  Assessment & Plan     Primary hypertension  Under fair control we will add amlodipine continue with losartan track his blood pressures at home warned of side effects  - amLODIPine (NORVASC) 5 MG tablet; Take 1 tablet (5 mg) by mouth daily  - Basic metabolic panel; Future    Morbid obesity (H)  Weight loss reviewed    Chronic gout of ankle, unspecified cause, unspecified laterality  Overall been doing well has not used indomethacin in over a month we will recheck uric acid  - Uric acid; Future    Gastroesophageal reflux disease without esophagitis  Chronic not responding to omeprazole or Pepcid.  In response to an acids twice daily will get EGD set up  - Adult Gastro Ref - Procedure Only; Future    Hyperlipidemia, unspecified hyperlipidemia type  Treated with diet consider cardiac calcium score               BMI:   Estimated body mass index is 35.38 kg/m  as calculated from the following:    Height as of this encounter: 1.943 m (6' 4.5\").    Weight as of this encounter: 133.6 kg (294 lb 8 oz).   Weight management plan: Discussed healthy diet and exercise guidelines        No follow-ups on file.    Kvng Ordonez MD  [facility]    Gus Rodriguez is a 47 year old who presents for the following health issues  accompanied by his self.  Overall doing well.  Been very busy between work and family.  Not a lot of time to exercise.  Knows he needs to lose weight.  Blood pressures are running a little high at home.  Has occasional flare of gout.  Chronic reflux issues yet    History of Present Illness       Hypertension: He presents for follow up of hypertension.  He does check blood pressure  regularly outside of the clinic. Outside blood pressures have been over 140/90. He does not follow a low salt diet.     He eats 0-1 servings of fruits and vegetables daily.He consumes 1 sweetened beverage(s) daily.He exercises with enough effort to increase his heart rate 10 to 19 minutes per day.  " He exercises with enough effort to increase his heart rate 3 or less days per week.   He is taking medications regularly.         Patient Active Problem List   Diagnosis     Hypertension     Obesity     Hyperlipidemia     Gout     Gastroesophageal reflux disease     Morbid obesity (H)     Current Outpatient Medications   Medication     amLODIPine (NORVASC) 5 MG tablet     indomethacin (INDOCIN) 50 MG capsule     losartan (COZAAR) 50 MG tablet     No current facility-administered medications for this visit.         Review of Systems   Constitutional, HEENT, cardiovascular, pulmonary, gi and gu systems are negative, except as otherwise noted.      Objective    There were no vitals taken for this visit.  There is no height or weight on file to calculate BMI.  Physical Exam   GENERAL: healthy, alert and no distress  NECK: no adenopathy, no asymmetry, masses, or scars and thyroid normal to palpation  RESP: lungs clear to auscultation - no rales, rhonchi or wheezes  CV: regular rate and rhythm, normal S1 S2, no S3 or S4, no murmur, click or rub, no peripheral edema and peripheral pulses strong  ABDOMEN: soft, nontender, no hepatosplenomegaly, no masses and bowel sounds normal  MS: no gross musculoskeletal defects noted, no edema

## 2022-04-27 NOTE — LETTER
April 28, 2022      Michael Mcmullen  [address] [place] [id number]        Dear ,    We are writing to inform you of your test results.    Test results indicate you may require additional follow up, see comment below.  Overall your labs look fine.  Your uric acid level is slightly elevated.  If you have frequent flares of your gout we will talk about putting you on a medication to lower your uric acid levels.    Resulted Orders   Basic metabolic panel   Result Value Ref Range    Sodium 139 133 - 144 mmol/L    Potassium 4.4 3.4 - 5.3 mmol/L    Chloride 105 94 - 109 mmol/L    Carbon Dioxide (CO2) 30 20 - 32 mmol/L      Comment:      This result was previously suppressed from the chart.    Anion Gap 4 3 - 14 mmol/L      Comment:      This result was previously suppressed from the chart.    Urea Nitrogen 16 7 - 30 mg/dL      Comment:      This result was previously suppressed from the chart.    Creatinine 1.03 0.66 - 1.25 mg/dL      Comment:      This result was previously suppressed from the chart.    Calcium 10.1 8.5 - 10.1 mg/dL      Comment:      This result was previously suppressed from the chart.    Glucose 90 70 - 99 mg/dL      Comment:      This result was previously suppressed from the chart.    GFR Estimate 90 >60 mL/min/1.73m2      Comment:      Effective December 21, 2021 eGFRcr in adults is calculated using the 2021 CKD-EPI creatinine equation which includes age and gender (Krysten et al., NEJM, DOI: 10.1056/LIBIlr2509925)   This result was previously suppressed from the chart.   Uric acid   Result Value Ref Range    Uric Acid 7.6 (H) 3.5 - 7.2 mg/dL       If you have any questions or concerns, please call the clinic at the number listed above.       Sincerely,      Kvng Ordonez MD

## 2022-05-06 ENCOUNTER — TELEPHONE (OUTPATIENT)
Dept: FAMILY MEDICINE | Facility: CLINIC | Age: 47
End: 2022-05-06
Payer: COMMERCIAL

## 2022-05-06 DIAGNOSIS — I10 PRIMARY HYPERTENSION: Primary | ICD-10-CM

## 2022-05-06 RX ORDER — LOSARTAN POTASSIUM 50 MG/1
50 TABLET ORAL DAILY
Qty: 90 TABLET | Refills: 3 | Status: SHIPPED | OUTPATIENT
Start: 2022-05-06 | End: 2023-05-10

## 2022-05-06 NOTE — TELEPHONE ENCOUNTER
Keep for 6-month visit no treatment for now of the uric acid nothing to change unless he has frequent flares of his gout

## 2022-05-06 NOTE — TELEPHONE ENCOUNTER
Reason for Call:  Uric Acid     Detailed comments: Patient calling wondering what Dr. Ordonez advises in regards to the high uric acid level. Also wondering if he should continue with the 6 month follow ups or go back to yearly     Phone Number Patient can be reached at: Cell number on file:    Telephone Information:   Mobile [phone]       Best Time: anytime    Can we leave a detailed message on this number? YES    Call taken on 5/6/2022 at 11:37 AM by Batsheva Rudolph

## 2022-05-06 NOTE — TELEPHONE ENCOUNTER
Per results letter:  Your uric acid level is slightly elevated.  If you have frequent flares of your gout we will talk about putting you on a medication to lower your uric acid levels.

## 2022-05-06 NOTE — TELEPHONE ENCOUNTER
Reason for Call:  Medication or medication refill:    Do you use a [facility]?  Name of the pharmacy and phone number for the current request:  [facility]    Name of the medication requested: Losartan    Other request: no    Can we leave a detailed message on this number? YES    Phone number patient can be reached at: [phone]    Best Time: anytime    Call taken on 5/6/2022 at 11:39 AM by Batsheva Rudolph

## 2022-05-06 NOTE — TELEPHONE ENCOUNTER
Prescription approved per [facility] Refill Protocol.  Last Written Prescription Date:  10/27/21  Last Fill Quantity: 90,  # refills: 1   Last office visit: 4/27/2022 with prescribing provider   4/27/22 JOSEULIS, uric acid completed

## 2022-06-08 ENCOUNTER — TRANSFERRED RECORDS (OUTPATIENT)
Dept: HEALTH INFORMATION MANAGEMENT | Facility: CLINIC | Age: 47
End: 2022-06-08
Payer: COMMERCIAL

## 2022-09-06 ENCOUNTER — TRANSFERRED RECORDS (OUTPATIENT)
Dept: HEALTH INFORMATION MANAGEMENT | Facility: CLINIC | Age: 47
End: 2022-09-06

## 2022-09-19 ENCOUNTER — TRANSFERRED RECORDS (OUTPATIENT)
Dept: HEALTH INFORMATION MANAGEMENT | Facility: CLINIC | Age: 47
End: 2022-09-19

## 2022-10-03 ENCOUNTER — HEALTH MAINTENANCE LETTER (OUTPATIENT)
Age: 47
End: 2022-10-03

## 2022-11-04 ENCOUNTER — TRANSFERRED RECORDS (OUTPATIENT)
Dept: HEALTH INFORMATION MANAGEMENT | Facility: CLINIC | Age: 47
End: 2022-11-04

## 2022-11-09 ENCOUNTER — OFFICE VISIT (OUTPATIENT)
Dept: FAMILY MEDICINE | Facility: CLINIC | Age: 47
End: 2022-11-09
Payer: COMMERCIAL

## 2022-11-09 VITALS
BODY MASS INDEX: 35.19 KG/M2 | SYSTOLIC BLOOD PRESSURE: 126 MMHG | TEMPERATURE: 98 F | WEIGHT: 298 LBS | DIASTOLIC BLOOD PRESSURE: 84 MMHG | HEIGHT: 77 IN | HEART RATE: 68 BPM

## 2022-11-09 DIAGNOSIS — E66.01 MORBID OBESITY (H): ICD-10-CM

## 2022-11-09 DIAGNOSIS — K22.719 BARRETT'S ESOPHAGUS WITH DYSPLASIA: ICD-10-CM

## 2022-11-09 DIAGNOSIS — K21.9 GASTROESOPHAGEAL REFLUX DISEASE WITHOUT ESOPHAGITIS: ICD-10-CM

## 2022-11-09 DIAGNOSIS — E78.5 HYPERLIPIDEMIA, UNSPECIFIED HYPERLIPIDEMIA TYPE: ICD-10-CM

## 2022-11-09 DIAGNOSIS — M10.00 IDIOPATHIC GOUT, UNSPECIFIED CHRONICITY, UNSPECIFIED SITE: ICD-10-CM

## 2022-11-09 DIAGNOSIS — Z00.00 WELL ADULT EXAM: ICD-10-CM

## 2022-11-09 DIAGNOSIS — Z23 NEED FOR VACCINATION: ICD-10-CM

## 2022-11-09 DIAGNOSIS — I10 PRIMARY HYPERTENSION: Primary | ICD-10-CM

## 2022-11-09 PROCEDURE — 90471 IMMUNIZATION ADMIN: CPT | Performed by: FAMILY MEDICINE

## 2022-11-09 PROCEDURE — 91313 COVID-19,PF,MODERNA BIVALENT: CPT | Performed by: FAMILY MEDICINE

## 2022-11-09 PROCEDURE — 80061 LIPID PANEL: CPT | Performed by: FAMILY MEDICINE

## 2022-11-09 PROCEDURE — 99214 OFFICE O/P EST MOD 30 MIN: CPT | Mod: 25 | Performed by: FAMILY MEDICINE

## 2022-11-09 PROCEDURE — 0134A COVID-19,PF,MODERNA BIVALENT: CPT | Performed by: FAMILY MEDICINE

## 2022-11-09 PROCEDURE — 80048 BASIC METABOLIC PNL TOTAL CA: CPT | Performed by: FAMILY MEDICINE

## 2022-11-09 PROCEDURE — 36415 COLL VENOUS BLD VENIPUNCTURE: CPT | Performed by: FAMILY MEDICINE

## 2022-11-09 PROCEDURE — 84550 ASSAY OF BLOOD/URIC ACID: CPT | Performed by: FAMILY MEDICINE

## 2022-11-09 PROCEDURE — 90686 IIV4 VACC NO PRSV 0.5 ML IM: CPT | Performed by: FAMILY MEDICINE

## 2022-11-09 PROCEDURE — 99396 PREV VISIT EST AGE 40-64: CPT | Mod: 25 | Performed by: FAMILY MEDICINE

## 2022-11-09 RX ORDER — AMLODIPINE BESYLATE 5 MG/1
5 TABLET ORAL DAILY
Qty: 90 TABLET | Refills: 1 | Status: SHIPPED | OUTPATIENT
Start: 2022-11-09 | End: 2023-04-26

## 2022-11-09 RX ORDER — PANTOPRAZOLE SODIUM 40 MG/1
40 TABLET, DELAYED RELEASE ORAL DAILY
COMMUNITY
End: 2023-05-12

## 2022-11-09 RX ORDER — INDOMETHACIN 50 MG/1
50 CAPSULE ORAL 3 TIMES DAILY PRN
Qty: 30 CAPSULE | Refills: 4 | Status: SHIPPED | OUTPATIENT
Start: 2022-11-09 | End: 2023-11-20

## 2022-11-09 ASSESSMENT — ENCOUNTER SYMPTOMS
DYSURIA: 0
ARTHRALGIAS: 1
COUGH: 0
NERVOUS/ANXIOUS: 0
FREQUENCY: 0
SHORTNESS OF BREATH: 0
WEAKNESS: 0
DIARRHEA: 0
HEARTBURN: 0
JOINT SWELLING: 0
EYE PAIN: 0
ABDOMINAL PAIN: 0
HEMATOCHEZIA: 0
HEADACHES: 0
FEVER: 0
PALPITATIONS: 0
DIZZINESS: 0
CHILLS: 0
HEMATURIA: 0
CONSTIPATION: 0
SORE THROAT: 0
MYALGIAS: 0
PARESTHESIAS: 0
NAUSEA: 0

## 2022-11-09 NOTE — LETTER
November 10, 2022      iMchael Mcmullen  [address] [facility] [id number]        Dear ,    We are writing to inform you of your test results.    Your test results fall within the expected range(s) or remain unchanged from previous results.  Please continue with current treatment plan.    Resulted Orders   Lipid panel reflex to direct LDL Fasting   Result Value Ref Range    Cholesterol 184 <200 mg/dL    Triglycerides 150 (H) <150 mg/dL    Direct Measure HDL 34 (L) >=40 mg/dL    LDL Cholesterol Calculated 120 (H) <=100 mg/dL    Non HDL Cholesterol 150 (H) <130 mg/dL    Narrative    Cholesterol  Desirable:  <200 mg/dL    Triglycerides  Normal:  Less than 150 mg/dL  Borderline High:  150-199 mg/dL  High:  200-499 mg/dL  Very High:  Greater than or equal to 500 mg/dL    Direct Measure HDL  Female:  Greater than or equal to 50 mg/dL   Male:  Greater than or equal to 40 mg/dL    LDL Cholesterol  Desirable:  <100mg/dL  Above Desirable:  100-129 mg/dL   Borderline High:  130-159 mg/dL   High:  160-189 mg/dL   Very High:  >= 190 mg/dL    Non HDL Cholesterol  Desirable:  130 mg/dL  Above Desirable:  130-159 mg/dL  Borderline High:  160-189 mg/dL  High:  190-219 mg/dL  Very High:  Greater than or equal to 220 mg/dL   Basic metabolic panel   Result Value Ref Range    Sodium 140 136 - 145 mmol/L    Potassium 4.2 3.4 - 5.3 mmol/L    Chloride 105 98 - 107 mmol/L    Carbon Dioxide (CO2) 23 22 - 29 mmol/L    Anion Gap 12 7 - 15 mmol/L    Urea Nitrogen 19.3 6.0 - 20.0 mg/dL    Creatinine 0.95 0.67 - 1.17 mg/dL    Calcium 9.4 8.6 - 10.0 mg/dL    Glucose 91 70 - 99 mg/dL    GFR Estimate >90 >60 mL/min/1.73m2      Comment:      Effective December 21, 2021 eGFRcr in adults is calculated using the 2021 CKD-EPI creatinine equation which includes age and gender (Krysten cho al., NEJM, DOI: 10.1056/WNVThc9201667)   Uric acid   Result Value Ref Range    Uric Acid 7.0 3.4 - 7.0 mg/dL       If you have any questions or concerns, please  call the clinic at the number listed above.       Sincerely,      Kvng Ordonez MD

## 2022-11-09 NOTE — PROGRESS NOTES
SUBJECTIVE:   CC: Michael is an 47 year old who presents for preventative health visit.   Patient is .  His wife is in good health.  His children and stepchildren are in good health.  He has some chronic ankle pain.  No flares of his gout.  He is seeing GI for Green's esophagus.    Patient has been advised of split billing requirements and indicates understanding: Yes     Healthy Habits:     Getting at least 3 servings of Calcium per day:  NO    Bi-annual eye exam:  NO    Dental care twice a year:  Yes    Sleep apnea or symptoms of sleep apnea:  None    Diet:  Regular (no restrictions)    Frequency of exercise:  2-3 days/week    Duration of exercise:  Less than 15 minutes    Taking medications regularly:  Yes    Medication side effects:  None    PHQ-2 Total Score: 0    Additional concerns today:  No      Today's PHQ-2 Score:   PHQ-2 ( 1999 [employer]) 11/9/2022   Q1: Little interest or pleasure in doing things 0   Q2: Feeling down, depressed or hopeless 0   PHQ-2 Score 0   Q1: Little interest or pleasure in doing things Not at all   Q2: Feeling down, depressed or hopeless Not at all   PHQ-2 Score 0       Abuse: Current or Past(Physical, Sexual or Emotional)- No  Do you feel safe in your environment? Yes    Have you ever done Advance Care Planning? (For example, a Health Directive, POLST, or a discussion with a medical provider or your loved ones about your wishes): No, advance care planning information given to patient to review.  Patient declined advance care planning discussion at this time.    Social History     Tobacco Use     Smoking status: Never     Smokeless tobacco: Never   Substance Use Topics     Alcohol use: Yes     Comment: infrequently     If you drink alcohol do you typically have >3 drinks per day or >7 drinks per week? No    Alcohol Use 11/9/2022   Prescreen: >3 drinks/day or >7 drinks/week? No       Last PSA: No results found for: PSA    Reviewed orders with patient. Reviewed health maintenance  "and updated orders accordingly - Yes      Reviewed and updated as needed this visit by clinical staff   Tobacco  Allergies  Meds              Reviewed and updated as needed this visit by Provider                 Patient Active Problem List   Diagnosis     Hypertension     Obesity     Hyperlipidemia     Gout     Gastroesophageal reflux disease     Morbid obesity (H)     Current Outpatient Medications   Medication     amLODIPine (NORVASC) 5 MG tablet     indomethacin (INDOCIN) 50 MG capsule     losartan (COZAAR) 50 MG tablet     pantoprazole (PROTONIX) 40 MG EC tablet     No current facility-administered medications for this visit.         Review of Systems   Constitutional: Negative for chills and fever.   HENT: Negative for congestion, ear pain, hearing loss and sore throat.    Eyes: Negative for pain and visual disturbance.   Respiratory: Negative for cough and shortness of breath.    Cardiovascular: Negative for chest pain, palpitations and peripheral edema.   Gastrointestinal: Negative for abdominal pain, constipation, diarrhea, heartburn, hematochezia and nausea.   Genitourinary: Negative for dysuria, frequency, genital sores, hematuria, impotence, penile discharge and urgency.   Musculoskeletal: Positive for arthralgias. Negative for joint swelling and myalgias.   Skin: Negative for rash.   Neurological: Negative for dizziness, weakness, headaches and paresthesias.   Psychiatric/Behavioral: Negative for mood changes. The patient is not nervous/anxious.          OBJECTIVE:   /84 (BP Location: Right arm, Patient Position: Sitting, Cuff Size: Adult Large)   Pulse 68   Temp 98  F (36.7  C) (Temporal)   Ht 1.943 m (6' 4.5\")   Wt 135.2 kg (298 lb)   BMI 35.80 kg/m      Physical Exam  GENERAL: healthy, alert and no distress  EYES: Eyes grossly normal to inspection, PERRL and conjunctivae and sclerae normal  HENT: ear canals and TM's normal, nose and mouth without ulcers or lesions  NECK: no adenopathy, " "no asymmetry, masses, or scars and thyroid normal to palpation  RESP: lungs clear to auscultation - no rales, rhonchi or wheezes  CV: regular rate and rhythm, normal S1 S2, no S3 or S4, no murmur, click or rub, no peripheral edema and peripheral pulses strong  ABDOMEN: soft, nontender, no hepatosplenomegaly, no masses and bowel sounds normal  MS: no gross musculoskeletal defects noted, no edema  SKIN: no suspicious lesions or rashes  NEURO: Normal strength and tone, mentation intact and speech normal  PSYCH: mentation appears normal, affect normal/bright        ASSESSMENT/PLAN:   (I10) Primary hypertension  (primary encounter diagnosis)  Comment: Under good control continue with losartan and amlodipine  Plan: Basic metabolic panel, amLODIPine (NORVASC) 5         MG tablet            (Z00.00) Well adult exam  Comment: Healthcare maintenance reviewed  Plan:     (E78.5) Hyperlipidemia, unspecified hyperlipidemia type  Comment: Consider cardiac calcium score  Plan: Lipid panel reflex to direct LDL Fasting            (M10.00) Idiopathic gout, unspecified chronicity, unspecified site  Comment: No recent flareups  Plan: indomethacin (INDOCIN) 50 MG capsule, Uric acid            (K21.9) Gastroesophageal reflux disease without esophagitis  Comment: Followed by GI  Plan:     (K22.719) Green's esophagus with dysplasia  Comment: Followed by GI  Plan:     (E66.01) Morbid obesity (H)  Comment: Discussed weight loss  Plan:     (Z23) Need for vaccination  Comment:   Plan: INFLUENZA QUAD, PF (RIV4) (FLUBLOK),         COVID-19,PF,MODERNA BIVALENT 18+Yrs                    COUNSELING:   Reviewed preventive health counseling, as reflected in patient instructions       Regular exercise       Healthy diet/nutrition       Alcohol Use     Estimated body mass index is 35.8 kg/m  as calculated from the following:    Height as of this encounter: 1.943 m (6' 4.5\").    Weight as of this encounter: 135.2 kg (298 lb).     Weight management " plan: Discussed healthy diet and exercise guidelines    He reports that he has never smoked. He has never used smokeless tobacco.      Counseling Resources:  ATP IV Guidelines  Pooled Cohorts Equation Calculator  FRAX Risk Assessment  ICSI Preventive Guidelines  Dietary Guidelines for Americans, 2010  USDA's MyPlate  ASA Prophylaxis  Lung CA Screening    Kvng Ordonez MD  [facility]

## 2022-11-10 LAB
ANION GAP SERPL CALCULATED.3IONS-SCNC: 12 MMOL/L (ref 7–15)
BUN SERPL-MCNC: 19.3 MG/DL (ref 6–20)
CALCIUM SERPL-MCNC: 9.4 MG/DL (ref 8.6–10)
CHLORIDE SERPL-SCNC: 105 MMOL/L (ref 98–107)
CHOLEST SERPL-MCNC: 184 MG/DL
CREAT SERPL-MCNC: 0.95 MG/DL (ref 0.67–1.17)
DEPRECATED HCO3 PLAS-SCNC: 23 MMOL/L (ref 22–29)
GFR SERPL CREATININE-BSD FRML MDRD: >90 ML/MIN/1.73M2
GLUCOSE SERPL-MCNC: 91 MG/DL (ref 70–99)
HDLC SERPL-MCNC: 34 MG/DL
LDLC SERPL CALC-MCNC: 120 MG/DL
NONHDLC SERPL-MCNC: 150 MG/DL
POTASSIUM SERPL-SCNC: 4.2 MMOL/L (ref 3.4–5.3)
SODIUM SERPL-SCNC: 140 MMOL/L (ref 136–145)
TRIGL SERPL-MCNC: 150 MG/DL
URATE SERPL-MCNC: 7 MG/DL (ref 3.4–7)

## 2023-01-10 ENCOUNTER — TRANSFERRED RECORDS (OUTPATIENT)
Dept: HEALTH INFORMATION MANAGEMENT | Facility: CLINIC | Age: 48
End: 2023-01-10
Payer: COMMERCIAL

## 2023-01-30 DIAGNOSIS — I10 PRIMARY HYPERTENSION: ICD-10-CM

## 2023-01-31 RX ORDER — AMLODIPINE BESYLATE 5 MG/1
TABLET ORAL
Qty: 90 TABLET | Refills: 1 | OUTPATIENT
Start: 2023-01-31

## 2023-01-31 NOTE — TELEPHONE ENCOUNTER
Last Appointment: 11/9/2022    Future Appointments 1/31/2023 - 7/30/2023    None          RX: 11/9/22   #90     R-1  (6 Month Supply)  Should have refills on file    Pending Prescriptions:                       Disp   Refills    amLODIPine (NORVASC) 5 MG tablet [Pharmac*90 tab*1            Sig: TAKE ONE TABLET (5 MG) BY MOUTH DAILY

## 2023-04-25 ENCOUNTER — TRANSFERRED RECORDS (OUTPATIENT)
Dept: HEALTH INFORMATION MANAGEMENT | Facility: CLINIC | Age: 48
End: 2023-04-25
Payer: COMMERCIAL

## 2023-05-10 RX ORDER — SUCRALFATE 1 G/1
TABLET ORAL
COMMUNITY
Start: 2023-01-10 | End: 2023-05-12

## 2023-05-12 ENCOUNTER — OFFICE VISIT (OUTPATIENT)
Dept: FAMILY MEDICINE | Facility: CLINIC | Age: 48
End: 2023-05-12
Payer: COMMERCIAL

## 2023-05-12 VITALS
HEART RATE: 68 BPM | BODY MASS INDEX: 36.16 KG/M2 | RESPIRATION RATE: 20 BRPM | TEMPERATURE: 97.7 F | OXYGEN SATURATION: 95 % | SYSTOLIC BLOOD PRESSURE: 118 MMHG | DIASTOLIC BLOOD PRESSURE: 78 MMHG | WEIGHT: 301 LBS

## 2023-05-12 DIAGNOSIS — M70.22 OLECRANON BURSITIS OF LEFT ELBOW: ICD-10-CM

## 2023-05-12 DIAGNOSIS — E78.5 HYPERLIPIDEMIA, UNSPECIFIED HYPERLIPIDEMIA TYPE: ICD-10-CM

## 2023-05-12 DIAGNOSIS — E66.01 MORBID OBESITY (H): ICD-10-CM

## 2023-05-12 DIAGNOSIS — M10.00 IDIOPATHIC GOUT, UNSPECIFIED CHRONICITY, UNSPECIFIED SITE: ICD-10-CM

## 2023-05-12 DIAGNOSIS — K22.719 BARRETT'S ESOPHAGUS WITH DYSPLASIA: ICD-10-CM

## 2023-05-12 DIAGNOSIS — I10 PRIMARY HYPERTENSION: ICD-10-CM

## 2023-05-12 DIAGNOSIS — M70.42 PREPATELLAR BURSITIS OF LEFT KNEE: ICD-10-CM

## 2023-05-12 DIAGNOSIS — R06.83 SNORING: Primary | ICD-10-CM

## 2023-05-12 DIAGNOSIS — K21.9 GASTROESOPHAGEAL REFLUX DISEASE WITHOUT ESOPHAGITIS: ICD-10-CM

## 2023-05-12 DIAGNOSIS — K44.9 HIATAL HERNIA: ICD-10-CM

## 2023-05-12 DIAGNOSIS — N52.9 ERECTILE DYSFUNCTION, UNSPECIFIED ERECTILE DYSFUNCTION TYPE: ICD-10-CM

## 2023-05-12 PROCEDURE — 99214 OFFICE O/P EST MOD 30 MIN: CPT | Performed by: FAMILY MEDICINE

## 2023-05-12 RX ORDER — LOSARTAN POTASSIUM 50 MG/1
50 TABLET ORAL DAILY
Qty: 90 TABLET | Refills: 1 | Status: SHIPPED | OUTPATIENT
Start: 2023-05-12 | End: 2023-11-20

## 2023-05-12 RX ORDER — TADALAFIL 20 MG/1
20 TABLET ORAL DAILY PRN
Qty: 30 TABLET | Refills: 11 | Status: SHIPPED | OUTPATIENT
Start: 2023-05-12 | End: 2023-11-20

## 2023-05-12 RX ORDER — PANTOPRAZOLE SODIUM 40 MG/1
40 TABLET, DELAYED RELEASE ORAL DAILY
Qty: 90 TABLET | Refills: 3 | Status: SHIPPED | OUTPATIENT
Start: 2023-05-12 | End: 2023-11-20

## 2023-05-12 NOTE — PROGRESS NOTES
"  Assessment & Plan     Primary hypertension  Under good control with amlodipine and losartan  - losartan (COZAAR) 50 MG tablet; Take 1 tablet (50 mg) by mouth daily    Gastroesophageal reflux disease without esophagitis  Controlled with Protonix is followed by GI has a history of Green's esophagus sees him regularly also hiatal hernia  - pantoprazole (PROTONIX) 40 MG EC tablet; Take 1 tablet (40 mg) by mouth daily    Olecranon bursitis of left elbow  Fluid collection in his left olecranon bursa been chronic occasional flares up we will get orthopedic consult   - Orthopedic  Referral; Future    Prepatellar bursitis of left knee  Same as above  - Orthopedic  Referral; Future    Erectile dysfunction, unspecified erectile dysfunction type  Discussed treatment options we will look at tadalafil  - tadalafil (ADCIRCA/CIALIS) 20 MG tablet; Take 1 tablet (20 mg) by mouth daily as needed (For sexual activity)    Morbid obesity (H)  Discussed seeing dietitian discussed Ozempic  - Adult Comprehensive Weight Management  Referral; Future    Snoring  Chronic we will get sleep study  - HST-Home Sleep Apnea Test - Noxturnal Returnable; Future    Hiatal hernia  By GI    Idiopathic gout, unspecified chronicity, unspecified site  As needed indomethacin consider allopurinol    Hyperlipidemia, unspecified hyperlipidemia type  Treated with diet    Green's esophagus with dysplasia  Followed by GI               BMI:   Estimated body mass index is 36.16 kg/m  as calculated from the following:    Height as of 11/9/22: 1.943 m (6' 4.5\").    Weight as of this encounter: 136.5 kg (301 lb).   Weight management plan: Patient referred to endocrine and/or weight management specialty        Kvng Ordonez MD  [facility]    Gus Rodriguez is a 48 year old, presenting for the following health issues:  Patient here with wife today.  Their teenage kids are doing fine.  He has had chronic " problems with fluid over his left olecranon and left knee.  Occasionally swells up.  Also has had issues with gout he is concerned that this may represent gout.  Chronic problems with erectile dysfunction.  Chronic problems with snoring.  Chronic problems as of late.  He has an umbilical hernia he is not interested in fixing it.  He has some chronic back pain.  If he stands for long periods of time he does have some right testicular pain  Hypertension, Knee left (? Fluid or gout), and Elbow left (swollen)        5/12/2023     2:11 PM   Additional Questions   Roomed by Thu A     History of Present Illness       Hypertension: He presents for follow up of hypertension.  He does check blood pressure  regularly outside of the clinic. Outpatient blood pressures have not been over 140/90. He does not follow a low salt diet.     He eats 0-1 servings of fruits and vegetables daily.He consumes 1 sweetened beverage(s) daily.He exercises with enough effort to increase his heart rate 10 to 19 minutes per day.  He exercises with enough effort to increase his heart rate 3 or less days per week.   He is taking medications regularly.         Patient Active Problem List   Diagnosis     Hypertension     Obesity     Hyperlipidemia     Gout     Gastroesophageal reflux disease     Morbid obesity (H)     Current Outpatient Medications   Medication     amLODIPine (NORVASC) 5 MG tablet     indomethacin (INDOCIN) 50 MG capsule     losartan (COZAAR) 50 MG tablet     pantoprazole (PROTONIX) 40 MG EC tablet     tadalafil (ADCIRCA/CIALIS) 20 MG tablet     TURMERIC PO     No current facility-administered medications for this visit.   \        Review of Systems   Constitutional, HEENT, cardiovascular, pulmonary, gi and gu systems are negative, except as otherwise noted.      Objective    /78 (BP Location: Right arm, Patient Position: Sitting, Cuff Size: Adult Large)   Pulse 68   Temp 97.7  F (36.5  C) (Tympanic)   Resp 20   Wt 136.5  kg (301 lb)   SpO2 95%   BMI 36.16 kg/m    Body mass index is 36.16 kg/m .  Physical Exam   GENERAL: healthy, alert and no distress  HENT: ear canals and TM's normal, nose and mouth without ulcers or lesions  NECK: no adenopathy, no asymmetry, masses, or scars and thyroid normal to palpation  RESP: lungs clear to auscultation - no rales, rhonchi or wheezes  CV: regular rate and rhythm, normal S1 S2, no S3 or S4, no murmur, click or rub, no peripheral edema and peripheral pulses strong  ABDOMEN: soft, nontender, no hepatosplenomegaly, no masses and bowel sounds normal umbilical hernia noted  MS: Fluid collections over left olecranon and left prepatellar areas  Office Visit on 11/09/2022   Component Date Value Ref Range Status     Cholesterol 11/09/2022 184  <200 mg/dL Final     Triglycerides 11/09/2022 150 (H)  <150 mg/dL Final     Direct Measure HDL 11/09/2022 34 (L)  >=40 mg/dL Final     LDL Cholesterol Calculated 11/09/2022 120 (H)  <=100 mg/dL Final     Non HDL Cholesterol 11/09/2022 150 (H)  <130 mg/dL Final     Sodium 11/09/2022 140  136 - 145 mmol/L Final     Potassium 11/09/2022 4.2  3.4 - 5.3 mmol/L Final     Chloride 11/09/2022 105  98 - 107 mmol/L Final     Carbon Dioxide (CO2) 11/09/2022 23  22 - 29 mmol/L Final     Anion Gap 11/09/2022 12  7 - 15 mmol/L Final     Urea Nitrogen 11/09/2022 19.3  6.0 - 20.0 mg/dL Final     Creatinine 11/09/2022 0.95  0.67 - 1.17 mg/dL Final     Calcium 11/09/2022 9.4  8.6 - 10.0 mg/dL Final     Glucose 11/09/2022 91  70 - 99 mg/dL Final     GFR Estimate 11/09/2022 >90  >60 mL/min/1.73m2 Final    Effective December 21, 2021 eGFRcr in adults is calculated using the 2021 CKD-EPI creatinine equation which includes age and gender (Krysten et al., NEJ, DOI: 10.1056/LYNOuh1490837)     Uric Acid 11/09/2022 7.0  3.4 - 7.0 mg/dL Final

## 2023-05-15 ENCOUNTER — TELEPHONE (OUTPATIENT)
Dept: FAMILY MEDICINE | Facility: CLINIC | Age: 48
End: 2023-05-15
Payer: COMMERCIAL

## 2023-05-15 NOTE — TELEPHONE ENCOUNTER
Pt's insurance does not cover a Home Sleep Test.  Pt has been notified and he wishes to postpone an order for an in lab sleep test at this time.  Pt will let PCP know if he wishes to do in the future and where he would like to have it done.

## 2023-05-16 ENCOUNTER — TRANSFERRED RECORDS (OUTPATIENT)
Dept: HEALTH INFORMATION MANAGEMENT | Facility: CLINIC | Age: 48
End: 2023-05-16
Payer: COMMERCIAL

## 2023-06-07 ENCOUNTER — OFFICE VISIT (OUTPATIENT)
Dept: EDUCATION SERVICES | Facility: CLINIC | Age: 48
End: 2023-06-07
Payer: COMMERCIAL

## 2023-06-07 VITALS — BODY MASS INDEX: 35.35 KG/M2 | HEIGHT: 77 IN | WEIGHT: 299.4 LBS

## 2023-06-07 DIAGNOSIS — E66.9 OBESITY: Primary | ICD-10-CM

## 2023-06-07 PROCEDURE — 97802 MEDICAL NUTRITION INDIV IN: CPT | Performed by: DIETITIAN, REGISTERED

## 2023-06-07 NOTE — PATIENT INSTRUCTIONS
Calories/ day 2000     Dose Escalation Schedule  Weeks    Weekly Dose  1 through 4   0.25 mg  5 through 8   0.5 mg  9 through 12   1 mg  13 through 16   1.7 mg  Week 17 and onward  2.4 mg Maintenance dose    Call [facility]  desk # for next dose increasing week         Goals:  Practice healthy stress management (mindful eating, think are you physically hungry or are you board, stressed, emotional etc.) make of list of things to do besides eat.    Try to get good quality sleep with a goal of 7-8 hours per night.  Stay physically active on a daily basis and throughout the year.  Recommend a fitness tracker; gradually increase the average to a minimum of 6000 steps with the ultimate goal of 10,000 steps per day.      Eat in a healthy way; follow the plate method.  Keep a food record ([employer]; loseit).  Nutrition reference:  Eat 3 meals a day; snacks are optional.    A meal is 3 or more food groups; make it colorful for better nutrition.

## 2023-06-07 NOTE — LETTER
6/7/2023         RE: Michael Mcmullen  [address] [facility] [address]        Dear Colleague,    Thank you for referring your patient, Michael Mcmullen, to the [facility]. Please see a copy of my visit note below.          Medical Nutrition Therapy  Visit Type:Initial assessment and intervention    Michael Mcmullen presents today for MNT and education related to weight management for obesity Body mass index is 35.97 kg/m .  He is accompanied by self.     ASSESSMENT:   Patient comments/concerns relating to nutrition: Patient is feeling sluggish because of weight states it is harder to move around and be motivated.  Wt Readings from Last 10 Encounters:   06/07/23 135.8 kg (299 lb 6.4 oz)   05/12/23 136.5 kg (301 lb)   11/09/22 135.2 kg (298 lb)   04/27/22 133.6 kg (294 lb 8 oz)   10/27/21 131.5 kg (290 lb)   07/20/19 129.8 kg (286 lb 3.2 oz)   02/13/19 133.8 kg (295 lb)   12/20/17 133.5 kg (294 lb 6.4 oz)   11/21/16 132 kg (291 lb)   07/06/16 128.6 kg (283 lb 9.6 oz)       NUTRITION HISTORY:  Diet -patient admits to poor dietary habits with unhealthy food.  Intake of fruits and vegetables is very minimal   Granola bar or eggs and ryan or toast  Leftovers - brats, pasta, taco's, shredded chicken  Water, coffee creamer, monster energy   occ 3-5 beers/ week   Fruit - rare  Vegetables less than fruit    Misses meals?  Occasional  Eats out:  1-3 meals/per week     Previous diet education:  No     Food allergies/intolerances: No known Food allergies    Diet is high in: calories  Diet is low in: fiber, fruits and vegetables    EXERCISE: no regular exercise program    SOCIO/ECONOMIC:   Lives with: spouse and children    MEDICATIONS:  Current Outpatient Medications   Medication     Semaglutide-Weight Management (WEGOVY) 0.25 MG/0.5ML pen     amLODIPine (NORVASC) 5 MG tablet     indomethacin (INDOCIN) 50 MG capsule     losartan (COZAAR) 50 MG tablet     pantoprazole (PROTONIX) 40 MG EC tablet      "tadalafil (ADCIRCA/CIALIS) 20 MG tablet     TURMERIC PO     No current facility-administered medications for this visit.       LABS:  Lab Results   Component Value Date     11/09/2022      Lab Results   Component Value Date    POTASSIUM 4.2 11/09/2022    POTASSIUM 4.4 04/27/2022     Lab Results   Component Value Date    CHLORIDE 105 11/09/2022    CHLORIDE 105 04/27/2022     Lab Results   Component Value Date    JAY 9.4 11/09/2022     Lab Results   Component Value Date    CO2 23 11/09/2022    CO2 30 04/27/2022     Lab Results   Component Value Date    BUN 19.3 11/09/2022    BUN 16 04/27/2022     Lab Results   Component Value Date    CR 0.95 11/09/2022     Lab Results   Component Value Date    GLC 91 11/09/2022    GLC 90 04/27/2022     Lab Results   Component Value Date     11/09/2022     Direct Measure HDL   Date Value Ref Range Status   11/09/2022 34 (L) >=40 mg/dL Final   ]  GFR Estimate   Date Value Ref Range Status   11/09/2022 >90 >60 mL/min/1.73m2 Final     Comment:     Effective December 21, 2021 eGFRcr in adults is calculated using the 2021 CKD-EPI creatinine equation which includes age and gender (Krysten et al., NEJ, DOI: 10.1056/YUOPos8696950)     Lab Results   Component Value Date    CR 0.95 11/09/2022     No results found for: MICROALBUMIN    ANTHROPOMETRICS:  Vitals: Ht 1.943 m (6' 4.5\")   Wt 135.8 kg (299 lb 6.4 oz)   BMI 35.97 kg/m    Body mass index is 35.97 kg/m .      Wt Readings from Last 5 Encounters:   06/07/23 135.8 kg (299 lb 6.4 oz)   05/12/23 136.5 kg (301 lb)   11/09/22 135.2 kg (298 lb)   04/27/22 133.6 kg (294 lb 8 oz)   10/27/21 131.5 kg (290 lb)       ESTIMATED KCAL REQUIREMENTS:  2000 kcal per day    NUTRITION DIAGNOSIS: Overweight/Obesity related to increased calorie intake as evidenced by BMI Body mass index is 35.97 kg/m .     NUTRITION INTERVENTION:  Nutrition Prescription: Energy Intake: 2000 kcal/day  Education given to support: general nutrition guidelines, weight " reduction, exercise and portion control  Education Materials Provided: My Plate Planner/Choose My Plate and Fiber Facts increasing soluble fiber very specifically to help lower LDL cholesterol    Education on healthy behavior changes that can promote calorie reduction in diet.    We discussed why it is important to incorporate healthy lifestyle interventions to control overall health to prevent complications of being obese including the potential of developing diabetes and preventing heart disease.      Nutritional Psychiatry Your Brain on Food - discussion on how what you eat affects microbiome/ hormones and how you feel physically and emotionally.    Hunger/ Fullness cues - help identify how pt is feeling before, during, and after eating   Smart Snacking and 20 Ways to eat more fruit and vegetables.    Mindful eating - listening to body vs eating out of stress, boredom, emotion, eating to fuel body for strength and energy   Tracking food - balance of meals and snacks   Importance of daily physical activity as able to promote endorphin release       reduce stress, anxiety, and depression, improve sleep, increase energy level, improve muscle tone and strength ...   Recommend: Discussed options for medications and reduced calorie dietary guidelines.    Wegovy: proper use, mechanism of action, indications, dosing, injection schedule, safety and side effects.  Pt is shown a demonstration of the use of the pen and was able to return demonstration without difficulty.   Dose Escalation Schedule  Weeks                             Weekly Dose  1 through 4                      0.25 mg  5 through 8                      0.5 mg  9 through 12                    1 mg  13 through 16                  1.7 mg  Week 17 and onward      2.4 mg Maintenance dose    PATIENT'S BEHAVIOR CHANGE GOALS:   See Patient Instructions for patient stated behavior change goals. AVS was printed and given to patient at today's appointment.    MONITOR /  EVALUATE:  RD will monitor/evaluate:  Pertinent Labs  Weight change    FOLLOW-UP:  Follow up with RD as needed.    Time spent in minutes: 30  Encounter: Individual

## 2023-06-07 NOTE — PROGRESS NOTES
Medical Nutrition Therapy  Visit Type:Initial assessment and intervention    Michael Mcmullen presents today for MNT and education related to weight management for obesity Body mass index is 35.97 kg/m .  He is accompanied by self.     ASSESSMENT:   Patient comments/concerns relating to nutrition: Patient is feeling sluggish because of weight states it is harder to move around and be motivated.  Wt Readings from Last 10 Encounters:   06/07/23 135.8 kg (299 lb 6.4 oz)   05/12/23 136.5 kg (301 lb)   11/09/22 135.2 kg (298 lb)   04/27/22 133.6 kg (294 lb 8 oz)   10/27/21 131.5 kg (290 lb)   07/20/19 129.8 kg (286 lb 3.2 oz)   02/13/19 133.8 kg (295 lb)   12/20/17 133.5 kg (294 lb 6.4 oz)   11/21/16 132 kg (291 lb)   07/06/16 128.6 kg (283 lb 9.6 oz)       NUTRITION HISTORY:  Diet -patient admits to poor dietary habits with unhealthy food.  Intake of fruits and vegetables is very minimal   Granola bar or eggs and ryan or toast  Leftovers - brats, pasta, taco's, shredded chicken  Water, coffee creamer, monster energy   occ 3-5 beers/ week   Fruit - rare  Vegetables less than fruit    Misses meals?  Occasional  Eats out:  1-3 meals/per week     Previous diet education:  No     Food allergies/intolerances: No known Food allergies    Diet is high in: calories  Diet is low in: fiber, fruits and vegetables    EXERCISE: no regular exercise program    SOCIO/ECONOMIC:   Lives with: spouse and children    MEDICATIONS:  Current Outpatient Medications   Medication     Semaglutide-Weight Management (WEGOVY) 0.25 MG/0.5ML pen     amLODIPine (NORVASC) 5 MG tablet     indomethacin (INDOCIN) 50 MG capsule     losartan (COZAAR) 50 MG tablet     pantoprazole (PROTONIX) 40 MG EC tablet     tadalafil (ADCIRCA/CIALIS) 20 MG tablet     TURMERIC PO     No current facility-administered medications for this visit.       LABS:  Lab Results   Component Value Date     11/09/2022      Lab Results   Component Value Date    POTASSIUM 4.2  "11/09/2022    POTASSIUM 4.4 04/27/2022     Lab Results   Component Value Date    CHLORIDE 105 11/09/2022    CHLORIDE 105 04/27/2022     Lab Results   Component Value Date    JAY 9.4 11/09/2022     Lab Results   Component Value Date    CO2 23 11/09/2022    CO2 30 04/27/2022     Lab Results   Component Value Date    BUN 19.3 11/09/2022    BUN 16 04/27/2022     Lab Results   Component Value Date    CR 0.95 11/09/2022     Lab Results   Component Value Date    GLC 91 11/09/2022    GLC 90 04/27/2022     Lab Results   Component Value Date     11/09/2022     Direct Measure HDL   Date Value Ref Range Status   11/09/2022 34 (L) >=40 mg/dL Final   ]  GFR Estimate   Date Value Ref Range Status   11/09/2022 >90 >60 mL/min/1.73m2 Final     Comment:     Effective December 21, 2021 eGFRcr in adults is calculated using the 2021 CKD-EPI creatinine equation which includes age and gender (Krysten cho al., NE, DOI: 10.1056/CTBYqj4770025)     Lab Results   Component Value Date    CR 0.95 11/09/2022     No results found for: MICROALBUMIN    ANTHROPOMETRICS:  Vitals: Ht 1.943 m (6' 4.5\")   Wt 135.8 kg (299 lb 6.4 oz)   BMI 35.97 kg/m    Body mass index is 35.97 kg/m .      Wt Readings from Last 5 Encounters:   06/07/23 135.8 kg (299 lb 6.4 oz)   05/12/23 136.5 kg (301 lb)   11/09/22 135.2 kg (298 lb)   04/27/22 133.6 kg (294 lb 8 oz)   10/27/21 131.5 kg (290 lb)       ESTIMATED KCAL REQUIREMENTS:  2000 kcal per day    NUTRITION DIAGNOSIS: Overweight/Obesity related to increased calorie intake as evidenced by BMI Body mass index is 35.97 kg/m .     NUTRITION INTERVENTION:  Nutrition Prescription: Energy Intake: 2000 kcal/day  Education given to support: general nutrition guidelines, weight reduction, exercise and portion control  Education Materials Provided: My Plate Planner/Choose My Plate and Fiber Facts increasing soluble fiber very specifically to help lower LDL cholesterol    Education on healthy behavior changes that can " promote calorie reduction in diet.    We discussed why it is important to incorporate healthy lifestyle interventions to control overall health to prevent complications of being obese including the potential of developing diabetes and preventing heart disease.      Nutritional Psychiatry Your Brain on Food - discussion on how what you eat affects microbiome/ hormones and how you feel physically and emotionally.    Hunger/ Fullness cues - help identify how pt is feeling before, during, and after eating   Smart Snacking and 20 Ways to eat more fruit and vegetables.    Mindful eating - listening to body vs eating out of stress, boredom, emotion, eating to fuel body for strength and energy   Tracking food - balance of meals and snacks   Importance of daily physical activity as able to promote endorphin release       reduce stress, anxiety, and depression, improve sleep, increase energy level, improve muscle tone and strength ...   Recommend: Discussed options for medications and reduced calorie dietary guidelines.    Wegovy: proper use, mechanism of action, indications, dosing, injection schedule, safety and side effects.  Pt is shown a demonstration of the use of the pen and was able to return demonstration without difficulty.   Dose Escalation Schedule  Weeks                             Weekly Dose  1 through 4                      0.25 mg  5 through 8                      0.5 mg  9 through 12                    1 mg  13 through 16                  1.7 mg  Week 17 and onward      2.4 mg Maintenance dose    PATIENT'S BEHAVIOR CHANGE GOALS:   See Patient Instructions for patient stated behavior change goals. AVS was printed and given to patient at today's appointment.    MONITOR / EVALUATE:  RD will monitor/evaluate:  Pertinent Labs  Weight change    FOLLOW-UP:  Follow up with RD as needed.    Time spent in minutes: 30  Encounter: Individual

## 2023-06-09 ENCOUNTER — TELEPHONE (OUTPATIENT)
Dept: FAMILY MEDICINE | Facility: CLINIC | Age: 48
End: 2023-06-09
Payer: COMMERCIAL

## 2023-06-09 NOTE — TELEPHONE ENCOUNTER
Spoke with PA team for pt's insurance. Insurance will not approve Wegovy without a diagnosis of DM. They will not even review his case.    Called and LVM letting pt know this. Will also sent [employer] Message.

## 2023-07-12 ENCOUNTER — TELEPHONE (OUTPATIENT)
Dept: FAMILY MEDICINE | Facility: CLINIC | Age: 48
End: 2023-07-12
Payer: COMMERCIAL

## 2023-07-12 NOTE — TELEPHONE ENCOUNTER
Received fax from [facility] for patient's prescription:    Semaglutide-Weight Management (WEGOVY) 0.25 MG/0.5ML pen     Key: [id number]    I do see prior message about medication not being covered. I don't see that we tried to get PA approval. If someone could try, that would be great.

## 2023-07-18 NOTE — TELEPHONE ENCOUNTER
Central Prior Authorization Team   Phone: [phone]    CMM key that is provided is for an appeal. Please see TE and Mychart encounter 6/9/23 - it appears someone had tried completing a PA and has been denied.

## 2023-07-18 NOTE — TELEPHONE ENCOUNTER
Looks like PA was attempted with his patient's primary insurance. There is mention that he was going to check with his wife's insurance and see if they will cover it for him. At this time I don't see any follow up from patient.

## 2023-11-07 ENCOUNTER — MYC REFILL (OUTPATIENT)
Dept: FAMILY MEDICINE | Facility: CLINIC | Age: 48
End: 2023-11-07
Payer: COMMERCIAL

## 2023-11-07 DIAGNOSIS — N52.9 ERECTILE DYSFUNCTION, UNSPECIFIED ERECTILE DYSFUNCTION TYPE: ICD-10-CM

## 2023-11-07 DIAGNOSIS — I10 PRIMARY HYPERTENSION: ICD-10-CM

## 2023-11-09 DIAGNOSIS — I10 PRIMARY HYPERTENSION: ICD-10-CM

## 2023-11-10 RX ORDER — AMLODIPINE BESYLATE 5 MG/1
TABLET ORAL
Qty: 90 TABLET | Refills: 1 | Status: SHIPPED | OUTPATIENT
Start: 2023-11-10 | End: 2024-05-20

## 2023-11-10 RX ORDER — TADALAFIL 20 MG/1
20 TABLET ORAL DAILY PRN
Qty: 30 TABLET | Refills: 11 | OUTPATIENT
Start: 2023-11-10

## 2023-11-10 RX ORDER — AMLODIPINE BESYLATE 5 MG/1
TABLET ORAL
Qty: 90 TABLET | Refills: 1 | OUTPATIENT
Start: 2023-11-10

## 2023-11-20 ENCOUNTER — OFFICE VISIT (OUTPATIENT)
Dept: FAMILY MEDICINE | Facility: CLINIC | Age: 48
End: 2023-11-20
Payer: COMMERCIAL

## 2023-11-20 VITALS
DIASTOLIC BLOOD PRESSURE: 86 MMHG | HEART RATE: 85 BPM | RESPIRATION RATE: 16 BRPM | TEMPERATURE: 98 F | BODY MASS INDEX: 35.87 KG/M2 | SYSTOLIC BLOOD PRESSURE: 133 MMHG | WEIGHT: 303.8 LBS | OXYGEN SATURATION: 98 % | HEIGHT: 77 IN

## 2023-11-20 DIAGNOSIS — K21.9 GASTROESOPHAGEAL REFLUX DISEASE WITHOUT ESOPHAGITIS: ICD-10-CM

## 2023-11-20 DIAGNOSIS — I10 PRIMARY HYPERTENSION: Primary | ICD-10-CM

## 2023-11-20 DIAGNOSIS — E66.01 MORBID OBESITY (H): ICD-10-CM

## 2023-11-20 DIAGNOSIS — N52.9 ERECTILE DYSFUNCTION, UNSPECIFIED ERECTILE DYSFUNCTION TYPE: ICD-10-CM

## 2023-11-20 DIAGNOSIS — M10.00 IDIOPATHIC GOUT, UNSPECIFIED CHRONICITY, UNSPECIFIED SITE: ICD-10-CM

## 2023-11-20 LAB
ANION GAP SERPL CALCULATED.3IONS-SCNC: 11 MMOL/L (ref 7–15)
BUN SERPL-MCNC: 19.2 MG/DL (ref 6–20)
CALCIUM SERPL-MCNC: 9.2 MG/DL (ref 8.6–10)
CHLORIDE SERPL-SCNC: 106 MMOL/L (ref 98–107)
CREAT SERPL-MCNC: 1.13 MG/DL (ref 0.67–1.17)
DEPRECATED HCO3 PLAS-SCNC: 24 MMOL/L (ref 22–29)
EGFRCR SERPLBLD CKD-EPI 2021: 80 ML/MIN/1.73M2
GLUCOSE SERPL-MCNC: 104 MG/DL (ref 70–99)
POTASSIUM SERPL-SCNC: 3.9 MMOL/L (ref 3.4–5.3)
SODIUM SERPL-SCNC: 141 MMOL/L (ref 135–145)

## 2023-11-20 PROCEDURE — 99213 OFFICE O/P EST LOW 20 MIN: CPT

## 2023-11-20 PROCEDURE — 80048 BASIC METABOLIC PNL TOTAL CA: CPT

## 2023-11-20 PROCEDURE — 36415 COLL VENOUS BLD VENIPUNCTURE: CPT

## 2023-11-20 RX ORDER — TADALAFIL 20 MG/1
20 TABLET ORAL DAILY PRN
Qty: 30 TABLET | Refills: 11 | Status: SHIPPED | OUTPATIENT
Start: 2023-11-20

## 2023-11-20 RX ORDER — LOSARTAN POTASSIUM 50 MG/1
50 TABLET ORAL DAILY
Qty: 90 TABLET | Refills: 1 | Status: SHIPPED | OUTPATIENT
Start: 2023-11-20 | End: 2024-05-20

## 2023-11-20 RX ORDER — INDOMETHACIN 50 MG/1
50 CAPSULE ORAL 3 TIMES DAILY PRN
Qty: 30 CAPSULE | Refills: 4 | Status: SHIPPED | OUTPATIENT
Start: 2023-11-20

## 2023-11-20 RX ORDER — PANTOPRAZOLE SODIUM 40 MG/1
40 TABLET, DELAYED RELEASE ORAL DAILY
Qty: 90 TABLET | Refills: 3 | Status: SHIPPED | OUTPATIENT
Start: 2023-11-20

## 2023-11-20 RX ORDER — FLUTICASONE PROPIONATE 50 MCG
2 SPRAY, SUSPENSION (ML) NASAL PRN
COMMUNITY
Start: 2023-10-31

## 2023-11-20 NOTE — PROGRESS NOTES
"  Assessment & Plan     Primary hypertension  Stable on medication, will check BMP today.  - BASIC METABOLIC PANEL; Future  - losartan (COZAAR) 50 MG tablet; Take 1 tablet (50 mg) by mouth daily  - BASIC METABOLIC PANEL    Morbid obesity (H)  No history of medullary thyroid carcinoma.  Discussed common side effects and instructions for use.  Patient to follow-up in 4 to 6 weeks for refill of medication.  - liraglutide - Weight Management (SAXENDA) 18 MG/3ML pen; Inject 0.6 mg Subcutaneous daily for 7 days, THEN 1.2 mg daily for 7 days, THEN 1.8 mg daily for 7 days, THEN 2.4 mg daily for 7 days, THEN 3 mg daily for 62 days.    Idiopathic gout, unspecified chronicity, unspecified site  Idiopathic gout, patient takes as needed for flareups.  No current flareups.  - indomethacin (INDOCIN) 50 MG capsule; Take 1 capsule (50 mg) by mouth 3 times daily as needed (gout)    Gastroesophageal reflux disease without esophagitis  Ongoing use of pantoprazole for acid reflux.  Symptoms are controlled.  - pantoprazole (PROTONIX) 40 MG EC tablet; Take 1 tablet (40 mg) by mouth daily    Erectile dysfunction, unspecified erectile dysfunction type  Refill of medication the patient uses for erectile dysfunction.  No concerns noted today.  - tadalafil (ADCIRCA/CIALIS) 20 MG tablet; Take 1 tablet (20 mg) by mouth daily as needed (For sexual activity)             BMI:   Estimated body mass index is 36.5 kg/m  as calculated from the following:    Height as of this encounter: 1.943 m (6' 4.5\").    Weight as of this encounter: 137.8 kg (303 lb 12.8 oz).           DOMENICA Cruz CNP  [facility]    Gus Rodriguez is a 48 year old, presenting for the following health issues:  Recheck Medication        11/20/2023     3:20 PM   Additional Questions   Roomed by LIZZIE Dougherty       History of Present Illness       Reason for visit:  Follow up med check    He eats 0-1 servings of fruits and vegetables daily.He " "consumes 1 sweetened beverage(s) daily.He exercises with enough effort to increase his heart rate 10 to 19 minutes per day.  He exercises with enough effort to increase his heart rate 3 or less days per week.   He is taking medications regularly.                 Review of Systems   Constitutional, HEENT, cardiovascular, pulmonary, gi and gu systems are negative, except as otherwise noted.      Objective    /86 (BP Location: Right arm, Patient Position: Sitting, Cuff Size: Adult Large)   Pulse 85   Temp 98  F (36.7  C) (Oral)   Resp 16   Ht 1.943 m (6' 4.5\")   Wt 137.8 kg (303 lb 12.8 oz)   SpO2 98%   BMI 36.50 kg/m    Body mass index is 36.5 kg/m .  Physical Exam   GENERAL: healthy, alert and no distress  NECK: no adenopathy, no asymmetry, masses, or scars and thyroid normal to palpation  RESP: lungs clear to auscultation - no rales, rhonchi or wheezes  CV: regular rate and rhythm, normal S1 S2, no S3 or S4, no murmur, click or rub, no peripheral edema and peripheral pulses strong  ABDOMEN: soft, nontender, no hepatosplenomegaly, no masses and bowel sounds normal  MS: no gross musculoskeletal defects noted, no edema                      "

## 2023-12-03 ENCOUNTER — MYC REFILL (OUTPATIENT)
Dept: FAMILY MEDICINE | Facility: CLINIC | Age: 48
End: 2023-12-03
Payer: COMMERCIAL

## 2023-12-03 DIAGNOSIS — E66.01 MORBID OBESITY (H): ICD-10-CM

## 2023-12-03 DIAGNOSIS — I10 PRIMARY HYPERTENSION: ICD-10-CM

## 2023-12-03 DIAGNOSIS — N52.9 ERECTILE DYSFUNCTION, UNSPECIFIED ERECTILE DYSFUNCTION TYPE: ICD-10-CM

## 2023-12-04 RX ORDER — LOSARTAN POTASSIUM 50 MG/1
50 TABLET ORAL DAILY
Qty: 90 TABLET | Refills: 1 | OUTPATIENT
Start: 2023-12-04

## 2023-12-04 RX ORDER — TADALAFIL 20 MG/1
20 TABLET ORAL DAILY PRN
Qty: 30 TABLET | Refills: 11 | OUTPATIENT
Start: 2023-12-04

## 2024-03-25 NOTE — TELEPHONE ENCOUNTER
---------------------  From: Maliha Toribio RN ([address] [facility] ([address] [place] [address]) )   To: [employer] ([address] [place] [address]);     Sent: 5/12/2020 11:12:20 AM CDT  Subject: labs     Patient was scheduled for labs at 4pm on 5/12/20. He wanted labs for his gout done.  I called him and asked him to see you as he has not done so. Last visit for gout was advised to see you if not getting better. He also did not return for fasting labs in 2/19 as directed at last Px. I reminded him he should consider fasting labs are due. He agreed to schedule with you soon. Would you like to reorder the fasting labs and a uric acid prior to visit or see him first?---------------------  From: Thu Solorzano CMA ([facility] ([address] [place] [address]))   To: Taye Francois MD;     Sent: 5/12/2020 11:13:44 AM CDT  Subject: FW: labs---------------------  From: Taye Francois MD   To: Thu Solorzano CMA;     Sent: 5/12/2020 11:22:29 AM CDT  Subject: RE: labs     reordered the labs for May including fasting and uric acid with other labs. he can see me after the labs are done---------------------  From: Thu Solorzano CMA   To: [address] [facility] ([address] [place] [address]) ; [facility] ([address] [place] [address]);     Sent: 5/12/2020 11:49:20 AM CDT  Subject: FW: labsMLTCB   when he returns call will let him know to schedule fasting labs and F/U visit after.   PROVIDER:[TOKEN:[[id number]],FOLLOWUP:[2 weeks]]

## 2024-05-20 DIAGNOSIS — I10 PRIMARY HYPERTENSION: ICD-10-CM

## 2024-05-20 RX ORDER — LOSARTAN POTASSIUM 50 MG/1
TABLET ORAL
Qty: 90 TABLET | Refills: 1 | Status: SHIPPED | OUTPATIENT
Start: 2024-05-20

## 2024-05-20 RX ORDER — AMLODIPINE BESYLATE 5 MG/1
TABLET ORAL
Qty: 90 TABLET | Refills: 1 | Status: SHIPPED | OUTPATIENT
Start: 2024-05-20

## 2024-08-14 ENCOUNTER — OFFICE VISIT (OUTPATIENT)
Dept: FAMILY MEDICINE | Facility: CLINIC | Age: 49
End: 2024-08-14
Payer: COMMERCIAL

## 2024-08-14 VITALS
HEART RATE: 78 BPM | OXYGEN SATURATION: 98 % | TEMPERATURE: 97.8 F | RESPIRATION RATE: 16 BRPM | BODY MASS INDEX: 35.92 KG/M2 | HEIGHT: 77 IN | DIASTOLIC BLOOD PRESSURE: 79 MMHG | WEIGHT: 304.2 LBS | SYSTOLIC BLOOD PRESSURE: 120 MMHG

## 2024-08-14 DIAGNOSIS — R05.1 ACUTE COUGH: Primary | ICD-10-CM

## 2024-08-14 PROCEDURE — 99213 OFFICE O/P EST LOW 20 MIN: CPT | Performed by: FAMILY MEDICINE

## 2024-08-14 RX ORDER — BENZONATATE 200 MG/1
200 CAPSULE ORAL 3 TIMES DAILY PRN
Qty: 30 CAPSULE | Refills: 1 | Status: SHIPPED | OUTPATIENT
Start: 2024-08-14

## 2024-08-14 NOTE — PROGRESS NOTES
"  Assessment & Plan     Acute cough  Patient with acute cough following EGD.  Will cover him for possible aspiration with Augmentin and also Tessalon follow-up in a week if not better sooner if worse  - amoxicillin-clavulanate (AUGMENTIN) 875-125 MG tablet; Take 1 tablet by mouth 2 times daily for 10 days  - benzonatate (TESSALON) 200 MG capsule; Take 1 capsule (200 mg) by mouth 3 times daily as needed for cough          BMI  Estimated body mass index is 36.55 kg/m  as calculated from the following:    Height as of this encounter: 1.943 m (6' 4.5\").    Weight as of this encounter: 138 kg (304 lb 3.2 oz).             Gus Rodriguez is a 49 year old, presenting for the following health issues: No fevers chills or sweats no chest pain shortness of breath  URI (Patient had endoscopy 2 weeks ago-has had a dry cough since procedure and yesterday starting feel pressure in lungs, congestion, pressure in right ear. )      8/14/2024     4:49 PM   Additional Questions   Roomed by Eula JARA   Accompanied by Self     URI    History of Present Illness       Reason for visit:  Ongoing cough  Symptom onset:  1-2 weeks ago  Symptoms include:  Cough  Symptom intensity:  Mild  Symptom progression:  Staying the same  Had these symptoms before:  No  What makes it worse:  No  What makes it better:  No    He eats 2-3 servings of fruits and vegetables daily.He consumes 1 sweetened beverage(s) daily.He exercises with enough effort to increase his heart rate 10 to 19 minutes per day.  He exercises with enough effort to increase his heart rate 3 or less days per week.   He is taking medications regularly.           Patient Active Problem List   Diagnosis    Hypertension    Obesity    Hyperlipidemia    Gout    Gastroesophageal reflux disease    Morbid obesity (H)     Current Outpatient Medications   Medication Sig Dispense Refill    amLODIPine (NORVASC) 5 MG tablet TAKE ONE TABLET (5 MG) BY MOUTH DAILY 90 tablet 1    amoxicillin-clavulanate " "(AUGMENTIN) 875-125 MG tablet Take 1 tablet by mouth 2 times daily for 10 days 20 tablet 0    benzonatate (TESSALON) 200 MG capsule Take 1 capsule (200 mg) by mouth 3 times daily as needed for cough 30 capsule 1    indomethacin (INDOCIN) 50 MG capsule Take 1 capsule (50 mg) by mouth 3 times daily as needed (gout) 30 capsule 4    losartan (COZAAR) 50 MG tablet TAKE ONE TABLET (50 MG) BY MOUTH DAILY 90 tablet 1    pantoprazole (PROTONIX) 40 MG EC tablet Take 1 tablet (40 mg) by mouth daily (Patient taking differently: Take 40 mg by mouth 2 times daily) 90 tablet 3    tadalafil (ADCIRCA/CIALIS) 20 MG tablet Take 1 tablet (20 mg) by mouth daily as needed (For sexual activity) 30 tablet 11    TURMERIC PO Take by mouth daily      fluticasone (FLONASE) 50 MCG/ACT nasal spray Spray 2 sprays into both nostrils as needed (Patient not taking: Reported on 8/14/2024)      Semaglutide-Weight Management (WEGOVY) 0.25 MG/0.5ML pen Inject 0.25 mg Subcutaneous once a week (Patient not taking: Reported on 11/20/2023) 2 mL 0     No current facility-administered medications for this visit.           Review of Systems  Constitutional, HEENT, cardiovascular, pulmonary, gi and gu systems are negative, except as otherwise noted.      Objective    /79 (BP Location: Right arm, Patient Position: Sitting, Cuff Size: Adult Large)   Pulse 78   Temp 97.8  F (36.6  C)   Resp 16   Ht 1.943 m (6' 4.5\")   Wt 138 kg (304 lb 3.2 oz)   SpO2 98%   BMI 36.55 kg/m    Body mass index is 36.55 kg/m .  Physical Exam   GENERAL: alert and no distress  NECK: no adenopathy, no asymmetry, masses, or scars  RESP: lungs clear to auscultation - no rales, rhonchi or wheezes  CV: regular rate and rhythm, normal S1 S2, no S3 or S4, no murmur, click or rub, no peripheral edema  MS: no gross musculoskeletal defects noted, no edema            Signed Electronically by: Kvng Ordonez MD    "

## 2024-11-11 DIAGNOSIS — I10 PRIMARY HYPERTENSION: ICD-10-CM

## 2024-11-11 RX ORDER — LOSARTAN POTASSIUM 50 MG/1
TABLET ORAL
Qty: 90 TABLET | Refills: 1 | Status: SHIPPED | OUTPATIENT
Start: 2024-11-11

## 2024-11-11 RX ORDER — AMLODIPINE BESYLATE 5 MG/1
TABLET ORAL
Qty: 90 TABLET | Refills: 1 | Status: SHIPPED | OUTPATIENT
Start: 2024-11-11

## 2024-11-14 ENCOUNTER — ORDERS ONLY (AUTO-RELEASED) (OUTPATIENT)
Dept: FAMILY MEDICINE | Facility: CLINIC | Age: 49
End: 2024-11-14

## 2024-11-14 ENCOUNTER — OFFICE VISIT (OUTPATIENT)
Dept: FAMILY MEDICINE | Facility: CLINIC | Age: 49
End: 2024-11-14
Payer: COMMERCIAL

## 2024-11-14 VITALS
SYSTOLIC BLOOD PRESSURE: 116 MMHG | OXYGEN SATURATION: 96 % | BODY MASS INDEX: 37.57 KG/M2 | HEIGHT: 76 IN | TEMPERATURE: 98.4 F | WEIGHT: 308.5 LBS | HEART RATE: 76 BPM | RESPIRATION RATE: 18 BRPM | DIASTOLIC BLOOD PRESSURE: 76 MMHG

## 2024-11-14 DIAGNOSIS — E66.01 MORBID OBESITY (H): ICD-10-CM

## 2024-11-14 DIAGNOSIS — K21.9 GASTROESOPHAGEAL REFLUX DISEASE WITHOUT ESOPHAGITIS: ICD-10-CM

## 2024-11-14 DIAGNOSIS — Z12.11 SCREEN FOR COLON CANCER: ICD-10-CM

## 2024-11-14 DIAGNOSIS — N52.9 ERECTILE DYSFUNCTION, UNSPECIFIED ERECTILE DYSFUNCTION TYPE: ICD-10-CM

## 2024-11-14 DIAGNOSIS — I10 PRIMARY HYPERTENSION: Primary | ICD-10-CM

## 2024-11-14 DIAGNOSIS — E78.2 MIXED HYPERLIPIDEMIA: ICD-10-CM

## 2024-11-14 PROCEDURE — 36415 COLL VENOUS BLD VENIPUNCTURE: CPT

## 2024-11-14 PROCEDURE — 80048 BASIC METABOLIC PNL TOTAL CA: CPT

## 2024-11-14 PROCEDURE — 80061 LIPID PANEL: CPT

## 2024-11-14 PROCEDURE — 99396 PREV VISIT EST AGE 40-64: CPT | Mod: 25

## 2024-11-14 PROCEDURE — 90715 TDAP VACCINE 7 YRS/> IM: CPT

## 2024-11-14 PROCEDURE — 90471 IMMUNIZATION ADMIN: CPT

## 2024-11-14 RX ORDER — TADALAFIL 20 MG/1
20 TABLET ORAL DAILY PRN
Qty: 30 TABLET | Refills: 11 | Status: SHIPPED | OUTPATIENT
Start: 2024-11-14

## 2024-11-14 RX ORDER — BUPROPION HYDROCHLORIDE 75 MG/1
TABLET ORAL
Qty: 113 TABLET | Refills: 0 | Status: SHIPPED | OUTPATIENT
Start: 2024-11-14 | End: 2025-01-13

## 2024-11-14 SDOH — HEALTH STABILITY: PHYSICAL HEALTH: ON AVERAGE, HOW MANY DAYS PER WEEK DO YOU ENGAGE IN MODERATE TO STRENUOUS EXERCISE (LIKE A BRISK WALK)?: 1 DAY

## 2024-11-14 SDOH — HEALTH STABILITY: PHYSICAL HEALTH: ON AVERAGE, HOW MANY MINUTES DO YOU ENGAGE IN EXERCISE AT THIS LEVEL?: 10 MIN

## 2024-11-14 ASSESSMENT — SOCIAL DETERMINANTS OF HEALTH (SDOH): HOW OFTEN DO YOU GET TOGETHER WITH FRIENDS OR RELATIVES?: ONCE A WEEK

## 2024-11-14 NOTE — PROGRESS NOTES
"Preventive Care Visit  [facility]  [place]ueclif[place] FernandezDOMENICA mancera CNP, Family Medicine  Nov 14, 2024      Assessment & Plan     Primary hypertension  Stable, well-controlled on medication.  Will check BMP today, declines refill of medications and reports he has adequate.  - BASIC METABOLIC PANEL; Future  - BASIC METABOLIC PANEL    Gastroesophageal reflux disease without esophagitis  Well-controlled on medication, stable.    Mixed hyperlipidemia  Recheck lipids today.  10-year risk at last check was 3.4%.  - Lipid panel reflex to direct LDL Non-fasting; Future  - Lipid panel reflex to direct LDL Non-fasting    Screen for colon cancer  Previously done Cologuard was normal, repeat ordered.  - COLOGUARD([employer]); Future    Erectile dysfunction, unspecified erectile dysfunction type  Medication for ED reordered.  - tadalafil (ADCIRCA/CIALIS) 20 MG tablet; Take 1 tablet (20 mg) by mouth daily as needed (For sexual activity).    Morbid obesity (H)  Patient was previously prescribed semaglutide and was seen by dietitian.  His insurance did not cover this medication.  We discussed additional medications and decided to try patient on bupropion.  He does feel that overeating and food cravings are one of his main causes of obesity.  - buPROPion (WELLBUTRIN) 75 MG tablet; Take 1 tablet (75 mg) by mouth daily for 7 days, THEN 2 tablets (150 mg) daily.          The 10-year ASCVD risk score (Lily TERRAZAS, et al., 2019) is: 3.4%    Values used to calculate the score:      Age: 49 years      Sex: Male      Is Non- : No      Diabetic: No      Tobacco smoker: No      Systolic Blood Pressure: 116 mmHg      Is BP treated: Yes      HDL Cholesterol: 34 mg/dL      Total Cholesterol: 162 mg/dL    BMI  Estimated body mass index is 37.31 kg/m  as calculated from the following:    Height as of this encounter: 1.937 m (6' 4.25\").    Weight as of this encounter: 139.9 kg (308 lb 8 oz). "       Counseling  Appropriate preventive services were addressed with this patient via screening, questionnaire, or discussion as appropriate for fall prevention, nutrition, physical activity, Tobacco-use cessation, social engagement, weight loss and cognition.  Checklist reviewing preventive services available has been given to the patient.  Reviewed patient's diet, addressing concerns and/or questions.   He is at risk for lack of exercise and has been provided with information to increase physical activity for the benefit of his well-being.           Gus Rodriguez is a 49 year old, presenting for the following:  Physical (No concerns)        11/14/2024     3:42 PM   Additional Questions   Roomed by Mariah ROMERO          Health Care Directive  Patient does not have a Health Care Directive: Discussed advance care planning with patient; however, patient declined at this time.      11/14/2024   General Health   How would you rate your overall physical health? (!) FAIR   Feel stress (tense, anxious, or unable to sleep) Only a little      (!) STRESS CONCERN      11/14/2024   Nutrition   Three or more servings of calcium each day? (!) NO   Diet: Regular (no restrictions)   How many servings of fruit and vegetables per day? (!) 2-3   How many sweetened beverages each day? 0-1            11/14/2024   Exercise   Days per week of moderate/strenous exercise 1 day   Average minutes spent exercising at this level 10 min      (!) EXERCISE CONCERN      11/14/2024   Social Factors   Frequency of gathering with friends or relatives Once a week   Worry food won't last until get money to buy more No   Food not last or not have enough money for food? No   Do you have housing? (Housing is defined as stable permanent housing and does not include staying ouside in a car, in a tent, in an abandoned building, in an overnight shelter, or couch-surfing.) Yes   Are you worried about losing your housing? No   Lack of  "transportation? No   Unable to get utilities (heat,electricity)? No            11/14/2024   Dental   Dentist two times every year? Yes            11/14/2024   TB Screening   Were you born outside of the [place]? No              Today's PHQ-2 Score:       8/14/2024     4:28 PM   PHQ-2 ( 1999 [employer])   Q1: Little interest or pleasure in doing things 0    Q2: Feeling down, depressed or hopeless 0    PHQ-2 Score 0   Q1: Little interest or pleasure in doing things Not at all   Q2: Feeling down, depressed or hopeless Not at all   PHQ-2 Score 0       Patient-reported         11/14/2024   Substance Use   Alcohol more than 3/day or more than 7/wk No   Do you use any other substances recreationally? No        Social History     Tobacco Use    Smoking status: Never     Passive exposure: Never    Smokeless tobacco: Never   Vaping Use    Vaping status: Never Used   Substance Use Topics    Alcohol use: Yes     Comment: infrequently    Drug use: Never             11/14/2024   One time HIV Screening   Previous HIV test? Yes          11/14/2024   STI Screening   New sexual partner(s) since last STI/HIV test? No      ASCVD Risk   The 10-year ASCVD risk score (Lily TERRAZAS, et al., 2019) is: 4.1%    Values used to calculate the score:      Age: 49 years      Sex: Male      Is Non- : No      Diabetic: No      Tobacco smoker: No      Systolic Blood Pressure: 116 mmHg      Is BP treated: Yes      HDL Cholesterol: 34 mg/dL      Total Cholesterol: 184 mg/dL       Reviewed and updated as needed this visit by Provider                             Objective    Exam  /76 (BP Location: Right arm, Patient Position: Sitting, Cuff Size: Adult Large)   Pulse 76   Temp 98.4  F (36.9  C) (Tympanic)   Resp 18   Ht 1.937 m (6' 4.25\")   Wt 139.9 kg (308 lb 8 oz)   SpO2 96%   BMI 37.31 kg/m     Estimated body mass index is 37.31 kg/m  as calculated from the following:    Height as of this encounter: 1.937 m (6' " "4.25\").    Weight as of this encounter: 139.9 kg (308 lb 8 oz).    Physical Exam  GENERAL: alert and no distress  EYES: Eyes grossly normal to inspection, PERRL and conjunctivae and sclerae normal  HENT: ear canals and TM's normal, nose and mouth without ulcers or lesions  NECK: no adenopathy, no asymmetry, masses, or scars  RESP: lungs clear to auscultation - no rales, rhonchi or wheezes  CV: regular rate and rhythm, normal S1 S2, no S3 or S4, no murmur, click or rub, no peripheral edema  ABDOMEN: soft, nontender, no hepatosplenomegaly, no masses and bowel sounds normal  MS: no gross musculoskeletal defects noted, no edema  SKIN: no suspicious lesions or rashes  NEURO: Normal strength and tone, mentation intact and speech normal  PSYCH: mentation appears normal, affect normal/bright        Signed Electronically by: DOMENICA Cruz CNP    "

## 2024-11-15 LAB
ANION GAP SERPL CALCULATED.3IONS-SCNC: 10 MMOL/L (ref 7–15)
BUN SERPL-MCNC: 18.4 MG/DL (ref 6–20)
CALCIUM SERPL-MCNC: 9 MG/DL (ref 8.8–10.4)
CHLORIDE SERPL-SCNC: 105 MMOL/L (ref 98–107)
CHOLEST SERPL-MCNC: 162 MG/DL
CREAT SERPL-MCNC: 1.14 MG/DL (ref 0.67–1.17)
EGFRCR SERPLBLD CKD-EPI 2021: 79 ML/MIN/1.73M2
FASTING STATUS PATIENT QL REPORTED: NO
FASTING STATUS PATIENT QL REPORTED: NO
GLUCOSE SERPL-MCNC: 89 MG/DL (ref 70–99)
HCO3 SERPL-SCNC: 24 MMOL/L (ref 22–29)
HDLC SERPL-MCNC: 34 MG/DL
LDLC SERPL CALC-MCNC: 100 MG/DL
NONHDLC SERPL-MCNC: 128 MG/DL
POTASSIUM SERPL-SCNC: 4.1 MMOL/L (ref 3.4–5.3)
SODIUM SERPL-SCNC: 139 MMOL/L (ref 135–145)
TRIGL SERPL-MCNC: 139 MG/DL

## 2025-05-27 DIAGNOSIS — I10 PRIMARY HYPERTENSION: ICD-10-CM

## 2025-05-27 DIAGNOSIS — M10.00 IDIOPATHIC GOUT, UNSPECIFIED CHRONICITY, UNSPECIFIED SITE: ICD-10-CM

## 2025-05-27 RX ORDER — INDOMETHACIN 50 MG/1
CAPSULE ORAL
Qty: 30 CAPSULE | Refills: 4 | Status: SHIPPED | OUTPATIENT
Start: 2025-05-27

## 2025-05-27 RX ORDER — AMLODIPINE BESYLATE 5 MG/1
5 TABLET ORAL
Qty: 90 TABLET | Refills: 1 | Status: SHIPPED | OUTPATIENT
Start: 2025-05-27

## 2025-05-27 RX ORDER — LOSARTAN POTASSIUM 50 MG/1
50 TABLET ORAL
Qty: 90 TABLET | Refills: 1 | Status: SHIPPED | OUTPATIENT
Start: 2025-05-27